# Patient Record
Sex: MALE | Race: WHITE | NOT HISPANIC OR LATINO | Employment: FULL TIME | URBAN - METROPOLITAN AREA
[De-identification: names, ages, dates, MRNs, and addresses within clinical notes are randomized per-mention and may not be internally consistent; named-entity substitution may affect disease eponyms.]

---

## 2017-03-17 ENCOUNTER — OFFICE VISIT (OUTPATIENT)
Dept: PSYCHIATRY | Facility: CLINIC | Age: 42
End: 2017-03-17
Payer: COMMERCIAL

## 2017-03-17 VITALS
HEIGHT: 71 IN | SYSTOLIC BLOOD PRESSURE: 142 MMHG | BODY MASS INDEX: 37.94 KG/M2 | WEIGHT: 271 LBS | HEART RATE: 66 BPM | DIASTOLIC BLOOD PRESSURE: 79 MMHG

## 2017-03-17 DIAGNOSIS — G47.00 INSOMNIA, UNSPECIFIED TYPE: ICD-10-CM

## 2017-03-17 DIAGNOSIS — F32.1 MODERATE SINGLE CURRENT EPISODE OF MAJOR DEPRESSIVE DISORDER: ICD-10-CM

## 2017-03-17 DIAGNOSIS — F43.10 PTSD (POST-TRAUMATIC STRESS DISORDER): Primary | ICD-10-CM

## 2017-03-17 PROCEDURE — 99204 OFFICE O/P NEW MOD 45 MIN: CPT | Mod: S$GLB,,, | Performed by: PSYCHIATRY & NEUROLOGY

## 2017-03-17 PROCEDURE — 99999 PR PBB SHADOW E&M-NEW PATIENT-LVL III: CPT | Mod: PBBFAC,,, | Performed by: PSYCHIATRY & NEUROLOGY

## 2017-03-17 PROCEDURE — 1160F RVW MEDS BY RX/DR IN RCRD: CPT | Mod: S$GLB,,, | Performed by: PSYCHIATRY & NEUROLOGY

## 2017-03-17 RX ORDER — SERTRALINE HYDROCHLORIDE 50 MG/1
50 TABLET, FILM COATED ORAL DAILY
Qty: 30 TABLET | Refills: 1 | Status: SHIPPED | OUTPATIENT
Start: 2017-03-17 | End: 2019-01-14

## 2017-03-17 RX ORDER — PRAZOSIN HYDROCHLORIDE 1 MG/1
1 CAPSULE ORAL NIGHTLY
Qty: 30 CAPSULE | Refills: 1 | Status: SHIPPED | OUTPATIENT
Start: 2017-03-17 | End: 2019-01-14
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
## 2019-01-14 ENCOUNTER — OFFICE VISIT (OUTPATIENT)
Dept: UROLOGY | Facility: CLINIC | Age: 44
End: 2019-01-14
Payer: COMMERCIAL

## 2019-01-14 VITALS
SYSTOLIC BLOOD PRESSURE: 136 MMHG | BODY MASS INDEX: 38.27 KG/M2 | WEIGHT: 273.38 LBS | HEIGHT: 71 IN | HEART RATE: 81 BPM | DIASTOLIC BLOOD PRESSURE: 89 MMHG

## 2019-01-14 DIAGNOSIS — E29.1 TESTICULAR FAILURE: Primary | ICD-10-CM

## 2019-01-14 PROCEDURE — 99999 PR PBB SHADOW E&M-EST. PATIENT-LVL III: ICD-10-PCS | Mod: PBBFAC,,, | Performed by: UROLOGY

## 2019-01-14 PROCEDURE — 99999 PR PBB SHADOW E&M-EST. PATIENT-LVL III: CPT | Mod: PBBFAC,,, | Performed by: UROLOGY

## 2019-01-14 PROCEDURE — 99204 PR OFFICE/OUTPT VISIT, NEW, LEVL IV, 45-59 MIN: ICD-10-PCS | Mod: S$GLB,,, | Performed by: UROLOGY

## 2019-01-14 PROCEDURE — 3008F BODY MASS INDEX DOCD: CPT | Mod: CPTII,S$GLB,, | Performed by: UROLOGY

## 2019-01-14 PROCEDURE — 99204 OFFICE O/P NEW MOD 45 MIN: CPT | Mod: S$GLB,,, | Performed by: UROLOGY

## 2019-01-14 PROCEDURE — 3008F PR BODY MASS INDEX (BMI) DOCUMENTED: ICD-10-PCS | Mod: CPTII,S$GLB,, | Performed by: UROLOGY

## 2019-01-14 NOTE — PROGRESS NOTES
"Chief Complaint:  Infertility    HPI:    Mr. Coffman is a 43 y.o.  male who has been  to his wife for the past 8 months. They have been trying to achieve a pregnancy for the past 8 months but without success. 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 He denies a history of erectile dysfunction and ejaculatory problems.    He is s/p a vasectomy and a vas reversal in 2016.  He was also on TRT.  He  stopped TRT 1 month ago.    He has achieved 3 pregnancies in the past with a different partner.    Kathy Coffman is 28 years old. ( 1/[birth year]/91) Her menses are regular. She has not undergone prior hysterosalpingogram. She has achieved 1 prior pregnancies with a different partner.  She sees Dr. Atwood, Sr.    The couple has not undergone prior intrauterine insemination procedures.    The couple has not undergone prior in-vitro fertilization procedures.    Eliel Coffman denies a history of exposure to harmful chemicals, toxins, and radiation.    No history of recent fevers greater than 101.5 degrees Farenheit.    No history of recent exposure to "wet heat."    No history of urological trauma or testicular torsion.    No history of prostatitis, epididymitis, and orchitis.    No history of post-pubertal mumps.    There is no known family history of fertility problems.    REVIEW OF SYSTEMS:     He denies headache, blurred vision, fever, nausea, vomiting, chills, flank discomfort, abdominal pain, bleeding per rectum, cough, SOB, recent loss of consciousness, recent mental status changes, seizures, dizziness, or upper/lower extremity weakness.    PHYSICAL EXAM:     The patient generally appears in good health, is appropriately interactive, and is in no apparent distress.     Eyes: anicteric sclerae, moist conjunctivae; no lid-lag; PERRLA     HENT: Atraumatic; oropharynx clear with moist mucous membranes and no mucosal ulcerations;normal hard and soft " "palate.  No evidence of lymphadenopathy.    Neck: Trachea midline.  No thyromegaly.    Musculoskeletal: No abnormal gait.    Skin: No lesions.    Mental: Cooperative with normal affect.  Is oriented to time, place, and person.    Neuro: Grossly intact.    Chest: Normal inspiratory effort.   No accessory muscles.  No audible wheezes.  Respirations symmetric on inspiration and expiration.    Heart: Regular rhythm.      Abdomen:  Soft, non-tender. No masses or organomegaly. Bladder is not palpable. No evidence of flank discomfort. No evidence of inguinal hernia.    Genitourinary: Penis is normal with no evidence of plaques or induration. Urethral meatus is normal. Scrotum is normal. Testes are descended bilaterally with no evidence of abnormal masses or tenderness. Epididymis, vas deferens, and cord structures are normal bilaterally.  Testicular volume is approximately 20 cc bilaterally.    Extremities: No cyanosis, clubbing, or edema.    IMPRESSION & PLAN:    Mr. Coffman is a 43 y.o.  male who has been  to his wife for the past 8 months. They have been trying to achieve a pregnancy for the past 8 months but without success. 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 He denies a history of erectile dysfunction and ejaculatory problems.    He is s/p a vasectomy and a vas reversal in 2016.  He was also on TRT.  He  stopped TRT 1 month ago.    He has achieved 3 pregnancies in the past with a different partner.    1.  FSH, LH, testosterone, prolactin, and estradiol serum levels today.  2.  Semen analysis x 1 in 2 months.  3.  Discussed TRT's effect on spermatogenesis.  Continue to stay off this.  4.  Recommend avoiding "wet heat."  5.  Recommend taking a multivitamin and 500 mg of vitamin c daily in addition to the multivitamin.  6.  Please send a copy of the note to Dr. Atwood.  Thank you for the consultation.  7.  If T is low, will call in " clomid.  Will base that off his T.    CC: Angelic

## 2019-01-14 NOTE — LETTER
January 14, 2019      Umberto Atwood MD  [address]  [place] [address]           [facility] - Urology 4th Floor  [address]  [place] [address]  Phone: [phone]          Patient: Eliel Coffman   MR Number: [id number]   YOB: 1975   Date of Visit: 1/14/2019       Dear Dr. Umberto Atwood:    Thank you for referring Eliel Coffman to me for evaluation. Attached you will find relevant portions of my assessment and plan of care.    If you have questions, please do not hesitate to call me. I look forward to following Eliel Coffman along with you.    Sincerely,    Oziel Shaver MD    Enclosure  CC:  No Recipients    If you would like to receive this communication electronically, please contact [email or web address] or [phone] to request more information on [employer] access.    For providers and/or their staff who would like to refer a patient to [facility], please contact us through our one-stop-shop provider referral line, [facility], at [phone].    If you feel you have received this communication in error or would no longer like to receive these types of communications, please e-mail [email or web address]

## 2019-01-14 NOTE — LETTER
January 14, 2019        Umberto Atwood MD  [address]  [place] [address]             [facility] - Urology 4th Floor  [address]  [place] [address]  Phone: [phone]   Patient: Eliel Coffman   MR Number: [id number]   YOB: 1975   Date of Visit: 1/14/2019       Dear Dr. Atwood:    Thank you for referring Eliel Coffman to me for evaluation. Attached you will find relevant portions of my assessment and plan of care.    If you have questions, please do not hesitate to call me. I look forward to following Eliel Coffman along with you.    Sincerely,      Oziel Shaver MD            CC  No Recipients    Enclosure

## 2019-01-15 ENCOUNTER — TELEPHONE (OUTPATIENT)
Dept: UROLOGY | Facility: CLINIC | Age: 44
End: 2019-01-15

## 2019-01-15 DIAGNOSIS — E29.1 TESTICULAR FAILURE: Primary | ICD-10-CM

## 2019-01-15 NOTE — TELEPHONE ENCOUNTER
Pt notified of results. appt scheduled for am to have repeat level. Pt aware and verbalized understanding.

## 2019-01-16 ENCOUNTER — LAB VISIT (OUTPATIENT)
Dept: LAB | Facility: HOSPITAL | Age: 44
End: 2019-01-16
Attending: UROLOGY
Payer: COMMERCIAL

## 2019-01-16 DIAGNOSIS — E29.1 TESTICULAR FAILURE: ICD-10-CM

## 2019-01-16 LAB — TESTOST SERPL-MCNC: 228 NG/DL

## 2019-01-16 PROCEDURE — 36415 COLL VENOUS BLD VENIPUNCTURE: CPT | Mod: PO

## 2019-01-16 PROCEDURE — 84403 ASSAY OF TOTAL TESTOSTERONE: CPT

## 2019-01-17 ENCOUNTER — TELEPHONE (OUTPATIENT)
Dept: UROLOGY | Facility: CLINIC | Age: 44
End: 2019-01-17

## 2019-01-17 DIAGNOSIS — E29.1 TESTICULAR FAILURE: Primary | ICD-10-CM

## 2019-02-25 ENCOUNTER — LAB VISIT (OUTPATIENT)
Dept: LAB | Facility: HOSPITAL | Age: 44
End: 2019-02-25
Attending: UROLOGY
Payer: COMMERCIAL

## 2019-02-25 DIAGNOSIS — E29.1 TESTICULAR FAILURE: ICD-10-CM

## 2019-02-25 LAB — TESTOST SERPL-MCNC: 442 NG/DL

## 2019-02-25 PROCEDURE — 36415 COLL VENOUS BLD VENIPUNCTURE: CPT | Mod: PO

## 2019-02-25 PROCEDURE — 84403 ASSAY OF TOTAL TESTOSTERONE: CPT

## 2019-02-26 ENCOUNTER — TELEPHONE (OUTPATIENT)
Dept: UROLOGY | Facility: CLINIC | Age: 44
End: 2019-02-26

## 2019-02-26 NOTE — TELEPHONE ENCOUNTER
Pt notified of results, to continue taking current dose of clomid. Return to clinic in 3 months with two semen samples done prior to appt. Pt verbalized understanding.

## 2019-03-21 ENCOUNTER — TELEPHONE (OUTPATIENT)
Dept: UROLOGY | Facility: CLINIC | Age: 44
End: 2019-03-21

## 2019-03-21 NOTE — TELEPHONE ENCOUNTER
----- Message from Lulu Philip RN sent at 3/20/2019  5:14 PM CDT -----  Contact: self [phone]      ----- Message -----  From: Tereza Mulligan  Sent: 3/20/2019   1:09 PM  To: Sivakumar Shaver    Needs Advice    Reason for call: is calling to get a semen analysis done        Communication Preference: [phone]    Additional Information:

## 2019-03-21 NOTE — TELEPHONE ENCOUNTER
Spoke to pt. Reviewed semen analysis instructions with pt. Address and phone number given to [facility]. Pt aware and verbalized understanding.

## 2019-04-23 ENCOUNTER — PATIENT MESSAGE (OUTPATIENT)
Dept: UROLOGY | Facility: CLINIC | Age: 44
End: 2019-04-23

## 2019-04-23 ENCOUNTER — TELEPHONE (OUTPATIENT)
Dept: UROLOGY | Facility: CLINIC | Age: 44
End: 2019-04-23

## 2019-04-23 NOTE — TELEPHONE ENCOUNTER
----- Message from Mela May MA sent at 4/23/2019  1:11 PM CDT -----  Contact: [phone]  Needs Advice    Reason for call: pt is looking for the results of his SA that was done at [facility] 4/12/19         Communication Preference: [phone]    Additional Information: please call

## 2019-06-06 ENCOUNTER — OFFICE VISIT (OUTPATIENT)
Dept: UROLOGY | Facility: CLINIC | Age: 44
End: 2019-06-06
Payer: COMMERCIAL

## 2019-06-06 VITALS
DIASTOLIC BLOOD PRESSURE: 77 MMHG | WEIGHT: 273.38 LBS | SYSTOLIC BLOOD PRESSURE: 121 MMHG | HEIGHT: 71 IN | HEART RATE: 58 BPM | BODY MASS INDEX: 38.27 KG/M2

## 2019-06-06 DIAGNOSIS — E29.1 TESTICULAR FAILURE: Primary | ICD-10-CM

## 2019-06-06 PROCEDURE — 99999 PR PBB SHADOW E&M-EST. PATIENT-LVL III: ICD-10-PCS | Mod: PBBFAC,,, | Performed by: UROLOGY

## 2019-06-06 PROCEDURE — 99999 PR PBB SHADOW E&M-EST. PATIENT-LVL III: CPT | Mod: PBBFAC,,, | Performed by: UROLOGY

## 2019-06-06 PROCEDURE — 3008F PR BODY MASS INDEX (BMI) DOCUMENTED: ICD-10-PCS | Mod: CPTII,S$GLB,, | Performed by: UROLOGY

## 2019-06-06 PROCEDURE — 3008F BODY MASS INDEX DOCD: CPT | Mod: CPTII,S$GLB,, | Performed by: UROLOGY

## 2019-06-06 PROCEDURE — 99214 PR OFFICE/OUTPT VISIT, EST, LEVL IV, 30-39 MIN: ICD-10-PCS | Mod: S$GLB,,, | Performed by: UROLOGY

## 2019-06-06 PROCEDURE — 99214 OFFICE O/P EST MOD 30 MIN: CPT | Mod: S$GLB,,, | Performed by: UROLOGY

## 2019-06-06 RX ORDER — INDOMETHACIN 50 MG/1
CAPSULE ORAL
Refills: 0 | COMMUNITY
Start: 2019-05-31 | End: 2023-03-27

## 2019-06-06 RX ORDER — CLOMIPHENE CITRATE 50 MG/1
TABLET ORAL
Refills: 2 | COMMUNITY
Start: 2019-05-31 | End: 2019-06-06

## 2019-06-06 RX ORDER — CLOMIPHENE CITRATE 50 MG/1
TABLET ORAL
Qty: 15 TABLET | Refills: 5 | Status: SHIPPED | OUTPATIENT
Start: 2019-06-06 | End: 2020-01-06 | Stop reason: SDUPTHER

## 2019-06-06 NOTE — LETTER
June 6, 2019        Umberto Atwood MD  [address]  [place] [address]             [facility] - [facility] [place]  [address]  [place] [address]  Phone: [phone]   Patient: Eliel Coffman   MR Number: [id number]   YOB: 1975   Date of Visit: 6/6/2019       Dear Dr. Atwood:    Thank you for referring Eliel Coffman to me for evaluation. Attached you will find relevant portions of my assessment and plan of care.    If you have questions, please do not hesitate to call me. I look forward to following Eliel Coffman along with you.    Sincerely,      Oziel Shaver MD            CC  No Recipients    Enclosure

## 2019-06-06 NOTE — PROGRESS NOTES
"Chief Complaint:  Infertility    HPI:    Mr. Coffman is a 43 y.o.  male who has been  to his wife for the past 1 year. They have been trying to achieve a pregnancy for the past 1 year but without success. 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 He denies a history of erectile dysfunction and ejaculatory problems.    He is s/p a vasectomy and a vas reversal in .  He was also on TRT.  He  stopped TRT 4 months ago.    Lab Results   Component Value Date    TOTALTESTOST 442 2019    LABLH 1.2 2019    FSH 1.20 2019    ESTRADIOL 26 2019    PROLACTIN 6.0 2019      SA 19--1.5 cc/7.3 million per cc/24%/5.5%  SA 19--1.0 cc/102 million per cc/47%/5.5%    FOR REVIEW FROM PREVIOUS:    Mr. Coffman is a 43 y.o.  male who has been  to his wife for the past 8 months. They have been trying to achieve a pregnancy for the past 8 months but without success. Eliel Coffman has undergone a semen analysis x 1 at [facility] showing azoospermia, but I don't have the results. He denies a history of erectile dysfunction and ejaculatory problems.    He is s/p a vasectomy and a vas reversal in .  He was also on TRT.  He  stopped TRT 1 month ago.    He has achieved 3 pregnancies in the past with a different partner.    Kathy Coffman is 28 years old. ( 1/10/91) Her menses are regular. She has not undergone prior hysterosalpingogram. She has achieved 1 prior pregnancies with a different partner.  She sees Dr. Atwood, Sr.    The couple has not undergone prior intrauterine insemination procedures.    The couple has not undergone prior in-vitro fertilization procedures.    Eliel Coffman denies a history of exposure to harmful chemicals, toxins, and radiation.    No history of recent fevers greater than 101.5 degrees Farenheit.    No history of recent exposure to "wet heat."    No history of " urological trauma or testicular torsion.    No history of prostatitis, epididymitis, and orchitis.    No history of post-pubertal mumps.    There is no known family history of fertility problems.    REVIEW OF SYSTEMS:     He denies headache, blurred vision, fever, nausea, vomiting, chills, flank discomfort, abdominal pain, bleeding per rectum, cough, SOB, recent loss of consciousness, recent mental status changes, seizures, dizziness, or upper/lower extremity weakness.    PHYSICAL EXAM:     The patient generally appears in good health, is appropriately interactive, and is in no apparent distress.     Eyes: anicteric sclerae, moist conjunctivae; no lid-lag; PERRLA     HENT: Atraumatic; oropharynx clear with moist mucous membranes and no mucosal ulcerations;normal hard and soft palate.  No evidence of lymphadenopathy.    Neck: Trachea midline.  No thyromegaly.    Musculoskeletal: No abnormal gait.    Skin: No lesions.    Mental: Cooperative with normal affect.  Is oriented to time, place, and person.    Neuro: Grossly intact.    Chest: Normal inspiratory effort.   No accessory muscles.  No audible wheezes.  Respirations symmetric on inspiration and expiration.    Heart: Regular rhythm.      Abdomen:  Soft, non-tender. No masses or organomegaly. Bladder is not palpable. No evidence of flank discomfort. No evidence of inguinal hernia.    Genitourinary: Penis is normal with no evidence of plaques or induration. Urethral meatus is normal. Scrotum is normal. Testes are descended bilaterally with no evidence of abnormal masses or tenderness. Epididymis, vas deferens, and cord structures are normal bilaterally.  Testicular volume is approximately 20 cc bilaterally.    Extremities: No cyanosis, clubbing, or edema.    IMPRESSION & PLAN:    Mr. Coffman is a 43 y.o.  male who has been  to his wife for the past 1 year. They have been trying to achieve a pregnancy for the past 1 year but without success. 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 He denies a history of erectile dysfunction and ejaculatory problems.    He is s/p a vasectomy and a vas reversal in 2016.  He was also on TRT.  He  stopped TRT 4 months ago.  He's currently on clomid    Lab Results   Component Value Date    TOTALTESTOST 442 02/25/2019    LABLH 1.2 01/14/2019    FSH 1.20 01/14/2019    ESTRADIOL 26 01/14/2019    PROLACTIN 6.0 01/14/2019      SA 4/12/19--1.5 cc/7.3 million per cc/24%/5.5%  SA 5/20/19--1.0 cc/102 million per cc/47%/5.5%    1.  Went over the results of his SA and hormonal panel today  2.  Discussed TRT's effect on spermatogenesis.  Continue to stay off this.  3.  Discussed that his SA's are discordant, but this could be due to his TRT use and/or clomid as the most recent one is markedly improved.  4.  Recommend avoiding "wet heat."  5.  Recommend taking a multivitamin and 500 mg of vitamin c daily in addition to the multivitamin.  6.  Please send a copy of the note to Dr. Atwood.    7. Continue clomid. Refilled today  8.  From a male factor perspective efforts with natural means, IUI, and IVF are all appropriate.  9.  RTC 6 months if not pregnant by then.  10. I spent 25 minutes with the patient of which more than half was spent in direct consultation with the patient in regards to our treatment and plan.       CC: Angelic    "

## 2020-01-06 ENCOUNTER — OFFICE VISIT (OUTPATIENT)
Dept: UROLOGY | Facility: CLINIC | Age: 45
End: 2020-01-06
Payer: COMMERCIAL

## 2020-01-06 VITALS
HEIGHT: 71 IN | SYSTOLIC BLOOD PRESSURE: 122 MMHG | DIASTOLIC BLOOD PRESSURE: 85 MMHG | HEART RATE: 83 BPM | BODY MASS INDEX: 37.35 KG/M2 | WEIGHT: 266.75 LBS

## 2020-01-06 DIAGNOSIS — E29.1 TESTICULAR FAILURE: Primary | ICD-10-CM

## 2020-01-06 PROCEDURE — 99213 OFFICE O/P EST LOW 20 MIN: CPT | Mod: S$GLB,,, | Performed by: UROLOGY

## 2020-01-06 PROCEDURE — 3008F PR BODY MASS INDEX (BMI) DOCUMENTED: ICD-10-PCS | Mod: CPTII,S$GLB,, | Performed by: UROLOGY

## 2020-01-06 PROCEDURE — 3008F BODY MASS INDEX DOCD: CPT | Mod: CPTII,S$GLB,, | Performed by: UROLOGY

## 2020-01-06 PROCEDURE — 99999 PR PBB SHADOW E&M-EST. PATIENT-LVL III: CPT | Mod: PBBFAC,,, | Performed by: UROLOGY

## 2020-01-06 PROCEDURE — 99213 PR OFFICE/OUTPT VISIT, EST, LEVL III, 20-29 MIN: ICD-10-PCS | Mod: S$GLB,,, | Performed by: UROLOGY

## 2020-01-06 PROCEDURE — 99999 PR PBB SHADOW E&M-EST. PATIENT-LVL III: ICD-10-PCS | Mod: PBBFAC,,, | Performed by: UROLOGY

## 2020-01-06 RX ORDER — CLOMIPHENE CITRATE 50 MG/1
TABLET ORAL
Qty: 15 TABLET | Refills: 5 | Status: SHIPPED | OUTPATIENT
Start: 2020-01-06 | End: 2020-02-24 | Stop reason: SDUPTHER

## 2020-01-06 NOTE — PROGRESS NOTES
Chief Complaint:  Infertility    HPI:    Mr. Coffman is a 44 y.o.  male who has been  to his wife for the past 1 year. They have been trying to achieve a pregnancy for the past 1 year but without success. 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 He denies a history of erectile dysfunction and ejaculatory problems.    He is s/p a vasectomy and a vas reversal in .  He was also on TRT.  He  stopped TRT 10 months ago.  Has been on clomid.    Lab Results   Component Value Date    TOTALTESTOST 442 2019    LABLH 1.2 2019    FSH 1.20 2019    ESTRADIOL 26 2019    PROLACTIN 6.0 2019      SA 19--1.5 cc/7.3 million per cc/24%/5.5%  SA 19--1.0 cc/102 million per cc/47%/5.5%    FOR REVIEW FROM PREVIOUS:    Mr. Coffman is a 44 y.o.  male who has been  to his wife for the past 1 year. They have been trying to achieve a pregnancy for the past 1 year but without success. 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 He denies a history of erectile dysfunction and ejaculatory problems.    He is s/p a vasectomy and a vas reversal in 2016.  He was also on TRT.  He  stopped TRT 4 months ago.    Lab Results   Component Value Date    TOTALTESTOST 442 2019    LABLH 1.2 2019    FSH 1.20 2019    ESTRADIOL 26 2019    PROLACTIN 6.0 2019      SA 19--1.5 cc/7.3 million per cc/24%/5.5%  SA 19--1.0 cc/102 million per cc/47%/5.5%    He has achieved 3 pregnancies in the past with a different partner.    Kathy Coffman is 28 years old. ( [birth year]) Her menses are regular. She has not undergone prior hysterosalpingogram. She has achieved 1 prior pregnancies with a different partner.  She sees Dr. Angelic Sr.    The couple has not undergone prior intrauterine insemination  "procedures.    The couple has not undergone prior in-vitro fertilization procedures.    Eliel Coffman denies a history of exposure to harmful chemicals, toxins, and radiation.    No history of recent fevers greater than 101.5 degrees Farenheit.    No history of recent exposure to "wet heat."    No history of urological trauma or testicular torsion.    No history of prostatitis, epididymitis, and orchitis.    No history of post-pubertal mumps.    There is no known family history of fertility problems.    REVIEW OF SYSTEMS:     He denies headache, blurred vision, fever, nausea, vomiting, chills, flank discomfort, abdominal pain, bleeding per rectum, cough, SOB, recent loss of consciousness, recent mental status changes, seizures, dizziness, or upper/lower extremity weakness.    PHYSICAL EXAM:     The patient generally appears in good health, is appropriately interactive, and is in no apparent distress.     Eyes: anicteric sclerae, moist conjunctivae; no lid-lag; PERRLA     HENT: Atraumatic; oropharynx clear with moist mucous membranes and no mucosal ulcerations;normal hard and soft palate.  No evidence of lymphadenopathy.    Neck: Trachea midline.  No thyromegaly.    Musculoskeletal: No abnormal gait.    Skin: No lesions.    Mental: Cooperative with normal affect.  Is oriented to time, place, and person.    Neuro: Grossly intact.    Chest: Normal inspiratory effort.   No accessory muscles.  No audible wheezes.  Respirations symmetric on inspiration and expiration.    Heart: Regular rhythm.      Abdomen:  Soft, non-tender. No masses or organomegaly. Bladder is not palpable. No evidence of flank discomfort. No evidence of inguinal hernia.    Genitourinary: Penis is normal with no evidence of plaques or induration. Urethral meatus is normal. Scrotum is normal. Testes are descended bilaterally with no evidence of abnormal masses or tenderness. Epididymis, vas deferens, and cord structures are normal bilaterally.  " "Testicular volume is approximately 20 cc bilaterally.    Extremities: No cyanosis, clubbing, or edema.    IMPRESSION & PLAN:    Mr. Coffman is a 43 y.o.  male who has been  to his wife for the past 1 year. They have been trying to achieve a pregnancy for the past 1 year but without success. 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 He denies a history of erectile dysfunction and ejaculatory problems.    He is s/p a vasectomy and a vas reversal in 2016.  He was also on TRT.  He  stopped TRT 4 months ago.  He's currently on clomid    Lab Results   Component Value Date    TOTALTESTOST 442 02/25/2019    LABLH 1.2 01/14/2019    FSH 1.20 01/14/2019    ESTRADIOL 26 01/14/2019    PROLACTIN 6.0 01/14/2019      SA 4/12/19--1.5 cc/7.3 million per cc/24%/5.5%  SA 5/20/19--1.0 cc/102 million per cc/47%/5.5%    1.  Will draw T, LH, FSH, estradiol, and prolactin.  2.  Discussed TRT's effect on spermatogenesis.  Continue to stay off this.  3.  RTC 3 weeks with another SA.  4.  Recommend avoiding "wet heat."  5.  Recommend taking a multivitamin and 500 mg of vitamin c daily in addition to the multivitamin.  6.  Please send a copy of the note to Dr. Atwood.    7. Continue clomid. Refilled today  8.  From a male factor perspective efforts with natural means, IUI, and IVF are all appropriate.        CC: Angelic    "

## 2020-01-06 NOTE — LETTER
January 6, 2020        Umberto Atwood MD  [address]  [place] [address]             [facility] - Urology 4th Floor  [address]  [place] [address]  Phone: [phone]   Patient: Eliel Coffman   MR Number: [id number]   YOB: 1975   Date of Visit: 1/6/2020       Dear Dr. Atwood:    Thank you for referring Eliel Coffman to me for evaluation. Attached you will find relevant portions of my assessment and plan of care.    If you have questions, please do not hesitate to call me. I look forward to following Eliel Coffman along with you.    Sincerely,      Oziel Shaver MD            CC  No Recipients    Enclosure

## 2020-02-24 ENCOUNTER — OFFICE VISIT (OUTPATIENT)
Dept: UROLOGY | Facility: CLINIC | Age: 45
End: 2020-02-24
Payer: COMMERCIAL

## 2020-02-24 VITALS
DIASTOLIC BLOOD PRESSURE: 83 MMHG | HEIGHT: 71 IN | BODY MASS INDEX: 37.04 KG/M2 | HEART RATE: 76 BPM | WEIGHT: 264.56 LBS | SYSTOLIC BLOOD PRESSURE: 119 MMHG

## 2020-02-24 DIAGNOSIS — E29.1 TESTICULAR FAILURE: Primary | ICD-10-CM

## 2020-02-24 PROCEDURE — 99214 PR OFFICE/OUTPT VISIT, EST, LEVL IV, 30-39 MIN: ICD-10-PCS | Mod: S$GLB,,, | Performed by: UROLOGY

## 2020-02-24 PROCEDURE — 3008F BODY MASS INDEX DOCD: CPT | Mod: CPTII,S$GLB,, | Performed by: UROLOGY

## 2020-02-24 PROCEDURE — 99999 PR PBB SHADOW E&M-EST. PATIENT-LVL III: CPT | Mod: PBBFAC,,, | Performed by: UROLOGY

## 2020-02-24 PROCEDURE — 3008F PR BODY MASS INDEX (BMI) DOCUMENTED: ICD-10-PCS | Mod: CPTII,S$GLB,, | Performed by: UROLOGY

## 2020-02-24 PROCEDURE — 99999 PR PBB SHADOW E&M-EST. PATIENT-LVL III: ICD-10-PCS | Mod: PBBFAC,,, | Performed by: UROLOGY

## 2020-02-24 PROCEDURE — 99214 OFFICE O/P EST MOD 30 MIN: CPT | Mod: S$GLB,,, | Performed by: UROLOGY

## 2020-02-24 RX ORDER — CLOMIPHENE CITRATE 50 MG/1
TABLET ORAL
Qty: 15 TABLET | Refills: 5 | Status: SHIPPED | OUTPATIENT
Start: 2020-02-24 | End: 2023-03-27

## 2020-02-24 NOTE — PROGRESS NOTES
Chief Complaint:  Infertility    HPI:    Mr. Coffman is a 44 y.o.  male who has been  to his wife for the past 2 years. They have been trying to achieve a pregnancy for the past 2 years but without success. 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 He denies a history of erectile dysfunction and ejaculatory problems.    He is s/p a vasectomy and a vas reversal in .  He was also on TRT.  He  stopped TRT > 11 months ago.  Has been on clomid.  His most recent SA on clomid shows an isolated morphology defect.    Lab Results   Component Value Date    TOTALTESTOST 433 2020    LABLH 3.1 2020    FSH 1.50 2020    ESTRADIOL 24 2020    PROLACTIN 7.1 2020      SA 19--1.5 cc/7.3 million per cc/24%/5.5%  SA 19--1.0 cc/102 million per cc/47%/5.5%  SA 20--1.0 cc/145 million per cc/62%/7%    FOR REVIEW FROM PREVIOUS:    Mr. Coffman is a 44 y.o.  male who has been  to his wife for the past 1 year. They have been trying to achieve a pregnancy for the past 1 year but without success. 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 He denies a history of erectile dysfunction and ejaculatory problems.    He is s/p a vasectomy and a vas reversal in .  He was also on TRT.  He  stopped TRT 4 months ago.    Lab Results   Component Value Date    TOTALTESTOST 442 2019    LABLH 1.2 2019    FSH 1.20 2019    ESTRADIOL 26 2019    PROLACTIN 6.0 2019      SA 19--1.5 cc/7.3 million per cc/24%/5.5%  SA 19--1.0 cc/102 million per cc/47%/5.5%    He has achieved 3 pregnancies in the past with a different partner.    Kathy Coffman is 28 years old. ( 1/[birth year]) Her menses are regular. She has not undergone prior hysterosalpingogram. She has achieved 1 prior pregnancies with a  "different partner.  She sees Dr. Atwood, Sr.    The couple has not undergone prior intrauterine insemination procedures.    The couple has not undergone prior in-vitro fertilization procedures.    Eliel Coffman denies a history of exposure to harmful chemicals, toxins, and radiation.    No history of recent fevers greater than 101.5 degrees Farenheit.    No history of recent exposure to "wet heat."    No history of urological trauma or testicular torsion.    No history of prostatitis, epididymitis, and orchitis.    No history of post-pubertal mumps.    There is no known family history of fertility problems.    REVIEW OF SYSTEMS:     He denies headache, blurred vision, fever, nausea, vomiting, chills, flank discomfort, abdominal pain, bleeding per rectum, cough, SOB, recent loss of consciousness, recent mental status changes, seizures, dizziness, or upper/lower extremity weakness.    PHYSICAL EXAM:     The patient generally appears in good health, is appropriately interactive, and is in no apparent distress.     Eyes: anicteric sclerae, moist conjunctivae; no lid-lag; PERRLA     HENT: Atraumatic; oropharynx clear with moist mucous membranes and no mucosal ulcerations;normal hard and soft palate.  No evidence of lymphadenopathy.    Neck: Trachea midline.  No thyromegaly.    Musculoskeletal: No abnormal gait.    Skin: No lesions.    Mental: Cooperative with normal affect.  Is oriented to time, place, and person.    Neuro: Grossly intact.    Chest: Normal inspiratory effort.   No accessory muscles.  No audible wheezes.  Respirations symmetric on inspiration and expiration.    Heart: Regular rhythm.      Abdomen:  Soft, non-tender. No masses or organomegaly. Bladder is not palpable. No evidence of flank discomfort. No evidence of inguinal hernia.    Genitourinary: Penis is normal with no evidence of plaques or induration. Urethral meatus is normal. Scrotum is normal. Testes are descended bilaterally with no evidence " "of abnormal masses or tenderness. Epididymis, vas deferens, and cord structures are normal bilaterally.  Testicular volume is approximately 20 cc bilaterally.    Extremities: No cyanosis, clubbing, or edema.    IMPRESSION & PLAN:    Mr. Coffman is a 44 y.o.  male who has been  to his wife for the past 2 years. They have been trying to achieve a pregnancy for the past 2 years but without success. 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 He denies a history of erectile dysfunction and ejaculatory problems.    He is s/p a vasectomy and a vas reversal in 2016.  He was also on TRT.  He  stopped TRT > 11 months ago.  Has been on clomid.  His most recent SA on clomid shows an isolated morphology defect.    Lab Results   Component Value Date    TOTALTESTOST 433 01/06/2020    LABLH 3.1 01/06/2020    FSH 1.50 01/06/2020    ESTRADIOL 24 01/06/2020    PROLACTIN 7.1 01/06/2020      SA 4/12/19--1.5 cc/7.3 million per cc/24%/5.5%  SA 5/20/19--1.0 cc/102 million per cc/47%/5.5%  SA 2/13/20--1.0 cc/145 million per cc/62%/7%    1.  Went over the results of his SA and hormonal panel.    2.  Discussed TRT's effect on spermatogenesis.  Continue to stay off this.  3.  RTC 3 weeks with another SA.  4.  Recommend avoiding "wet heat."  5.  Recommend taking a multivitamin and 500 mg of vitamin c daily in addition to the multivitamin.  6.  Please send a copy of the note to Dr. Atwood.    7. Continue clomid. Refilled today  8.  From a male factor perspective efforts with natural means, IUI, and IVF are all appropriate.  9.  I spent 25 minutes with the patient of which more than half was spent in direct consultation with the patient in regards to our treatment and plan.    10.  RTC 6 months if not pregnant by then.        CC: Angelic    "

## 2020-02-24 NOTE — LETTER
February 24, 2020        Umberto Atwood MD  [address]  [place] [address]             [facility] - Urology 4th Floor  [address]  [place] [address]  Phone: [phone]   Patient: Eliel Coffman   MR Number: [id number]   YOB: 1975   Date of Visit: 2/24/2020       Dear Dr. Atwood:    Thank you for referring Eliel Coffman to me for evaluation. Attached you will find relevant portions of my assessment and plan of care.    If you have questions, please do not hesitate to call me. I look forward to following Eliel Coffman along with you.    Sincerely,      Oziel Shaver MD            CC  No Recipients    Enclosure

## 2020-08-10 ENCOUNTER — OFFICE VISIT (OUTPATIENT)
Dept: UROLOGY | Facility: CLINIC | Age: 45
End: 2020-08-10
Payer: COMMERCIAL

## 2020-08-10 VITALS
BODY MASS INDEX: 37.94 KG/M2 | HEIGHT: 71 IN | SYSTOLIC BLOOD PRESSURE: 131 MMHG | WEIGHT: 271 LBS | HEART RATE: 66 BPM | DIASTOLIC BLOOD PRESSURE: 89 MMHG

## 2020-08-10 DIAGNOSIS — E29.1 MALE HYPOGONADISM: Primary | ICD-10-CM

## 2020-08-10 PROCEDURE — 3008F PR BODY MASS INDEX (BMI) DOCUMENTED: ICD-10-PCS | Mod: CPTII,S$GLB,, | Performed by: UROLOGY

## 2020-08-10 PROCEDURE — 3008F BODY MASS INDEX DOCD: CPT | Mod: CPTII,S$GLB,, | Performed by: UROLOGY

## 2020-08-10 PROCEDURE — 99214 PR OFFICE/OUTPT VISIT, EST, LEVL IV, 30-39 MIN: ICD-10-PCS | Mod: S$GLB,,, | Performed by: UROLOGY

## 2020-08-10 PROCEDURE — 99214 OFFICE O/P EST MOD 30 MIN: CPT | Mod: S$GLB,,, | Performed by: UROLOGY

## 2020-08-10 PROCEDURE — 99999 PR PBB SHADOW E&M-EST. PATIENT-LVL III: CPT | Mod: PBBFAC,,, | Performed by: UROLOGY

## 2020-08-10 PROCEDURE — 99999 PR PBB SHADOW E&M-EST. PATIENT-LVL III: ICD-10-PCS | Mod: PBBFAC,,, | Performed by: UROLOGY

## 2020-08-10 RX ORDER — TESTOSTERONE 20.25 MG/1.25G
GEL TOPICAL
Qty: 1 BOTTLE | Refills: 5 | Status: SHIPPED | OUTPATIENT
Start: 2020-08-10 | End: 2023-03-31 | Stop reason: CLARIF

## 2020-08-10 NOTE — PROGRESS NOTES
Chief Complaint:  Infertility    HPI:    Mr. Coffman is a 45 y.o.  male who has been  to his wife for the past 2 years. They have been trying to achieve a pregnancy for the past 2 years but without success. 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 He denies a history of erectile dysfunction and ejaculatory problems.    He is s/p a vasectomy and a vas reversal in 2016.  He was also on TRT.  He  stopped TRT > 11 months ago.  Has been on clomid.  His most recent SA on clomid shows an isolated morphology defect.  However, his wife has not gotten pregnant.  He presents today stating that they are going to take a break from fertility. And he would like to go back to TRT.    Lab Results   Component Value Date    TOTALTESTOST 433 01/06/2020    LABLH 3.1 01/06/2020    FSH 1.50 01/06/2020    ESTRADIOL 24 01/06/2020    PROLACTIN 7.1 01/06/2020      SA 4/12/19--1.5 cc/7.3 million per cc/24%/5.5%  SA 5/20/19--1.0 cc/102 million per cc/47%/5.5%  SA 2/13/20--1.0 cc/145 million per cc/62%/7%    FOR REVIEW FROM PREVIOUS:    Mr. Coffman is a 45 y.o.  male who has been  to his wife for the past 2 years. They have been trying to achieve a pregnancy for the past 2 years but without success. 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 He denies a history of erectile dysfunction and ejaculatory problems.    He is s/p a vasectomy and a vas reversal in 2016.  He was also on TRT.  He  stopped TRT > 11 months ago.  Has been on clomid.  His most recent SA on clomid shows an isolated morphology defect.    Lab Results   Component Value Date    TOTALTESTOST 433 01/06/2020    LABLH 3.1 01/06/2020    FSH 1.50 01/06/2020    ESTRADIOL 24 01/06/2020    PROLACTIN 7.1 01/06/2020      SA 4/12/19--1.5 cc/7.3 million per cc/24%/5.5%  SA 5/20/19--1.0 cc/102 million per  "cc/47%/5.5%   20--1.0 cc/145 million per cc/62%/7%    He has achieved 3 pregnancies in the past with a different partner.    Kathy Coffman is 27 years old. ( [birth year]) Her menses are regular. She has not undergone prior hysterosalpingogram. She has achieved 1 prior pregnancies with a different partner.  She sees Dr. Atwood, Sr.    The couple has not undergone prior intrauterine insemination procedures.    The couple has not undergone prior in-vitro fertilization procedures.    Eliel Coffman denies a history of exposure to harmful chemicals, toxins, and radiation.    No history of recent fevers greater than 101.5 degrees Farenheit.    No history of recent exposure to "wet heat."    No history of urological trauma or testicular torsion.    No history of prostatitis, epididymitis, and orchitis.    No history of post-pubertal mumps.    There is no known family history of fertility problems.    REVIEW OF SYSTEMS:     He denies headache, blurred vision, fever, nausea, vomiting, chills, flank discomfort, abdominal pain, bleeding per rectum, cough, SOB, recent loss of consciousness, recent mental status changes, seizures, dizziness, or upper/lower extremity weakness.    PHYSICAL EXAM:     The patient generally appears in good health, is appropriately interactive, and is in no apparent distress.     Eyes: anicteric sclerae, moist conjunctivae; no lid-lag; PERRLA     HENT: Atraumatic; oropharynx clear with moist mucous membranes and no mucosal ulcerations;normal hard and soft palate.  No evidence of lymphadenopathy.    Neck: Trachea midline.  No thyromegaly.    Musculoskeletal: No abnormal gait.    Skin: No lesions.    Mental: Cooperative with normal affect.  Is oriented to time, place, and person.    Neuro: Grossly intact.    Chest: Normal inspiratory effort.   No accessory muscles.  No audible wheezes.  Respirations symmetric on inspiration and expiration.    Heart: Regular rhythm.      Abdomen:  Soft, " non-tender. No masses or organomegaly. Bladder is not palpable. No evidence of flank discomfort. No evidence of inguinal hernia.    Genitourinary: Penis is normal with no evidence of plaques or induration. Urethral meatus is normal. Scrotum is normal. Testes are descended bilaterally with no evidence of abnormal masses or tenderness. Epididymis, vas deferens, and cord structures are normal bilaterally.  Testicular volume is approximately 20 cc bilaterally.    Extremities: No cyanosis, clubbing, or edema.    IMPRESSION & PLAN:    Encounter Diagnosis   Name Primary?    Testicular failure Yes         1.  Discussed TRT's effect on spermatogenesis. He is aware and would like to go back to TRT.    2. Discussed the risks and benefits of testosterone replacement today.  This included possible cardiac risks.  He would like to try this.  Will check a T in 2 weeks and adjust the dose of TRT if necessary.  He can then RTC 6 months with T, PSA, CBC, hepatic panel, lipid panel.  A new Rx for Androgel 1.62% was given today.        CC: Angelic

## 2021-04-16 ENCOUNTER — PATIENT MESSAGE (OUTPATIENT)
Dept: RESEARCH | Facility: HOSPITAL | Age: 46
End: 2021-04-16

## 2021-10-05 ENCOUNTER — OFFICE VISIT (OUTPATIENT)
Dept: SPORTS MEDICINE | Facility: CLINIC | Age: 46
End: 2021-10-05
Payer: COMMERCIAL

## 2021-10-05 ENCOUNTER — HOSPITAL ENCOUNTER (OUTPATIENT)
Dept: RADIOLOGY | Facility: HOSPITAL | Age: 46
Discharge: HOME | End: 2021-10-05
Attending: PHYSICIAN ASSISTANT
Payer: COMMERCIAL

## 2021-10-05 VITALS
HEART RATE: 84 BPM | BODY MASS INDEX: 40.6 KG/M2 | DIASTOLIC BLOOD PRESSURE: 87 MMHG | WEIGHT: 290 LBS | SYSTOLIC BLOOD PRESSURE: 135 MMHG | HEIGHT: 71 IN

## 2021-10-05 DIAGNOSIS — M25.551 BILATERAL HIP PAIN: ICD-10-CM

## 2021-10-05 DIAGNOSIS — M25.552 BILATERAL HIP PAIN: ICD-10-CM

## 2021-10-05 DIAGNOSIS — S32.401A CLOSED NONDISPLACED FRACTURE OF RIGHT ACETABULUM, UNSPECIFIED PORTION OF ACETABULUM, INITIAL ENCOUNTER: Primary | ICD-10-CM

## 2021-10-05 PROCEDURE — 99999 PR PBB SHADOW E&M-EST. PATIENT-LVL III: ICD-10-PCS | Mod: PBBFAC,,, | Performed by: PHYSICIAN ASSISTANT

## 2021-10-05 PROCEDURE — 3008F BODY MASS INDEX DOCD: CPT | Mod: CPTII,S$GLB,, | Performed by: PHYSICIAN ASSISTANT

## 2021-10-05 PROCEDURE — 73521 XR HIPS BILATERAL 2 VIEW INCL AP PELVIS: ICD-10-PCS | Mod: 26,,, | Performed by: RADIOLOGY

## 2021-10-05 PROCEDURE — 3008F PR BODY MASS INDEX (BMI) DOCUMENTED: ICD-10-PCS | Mod: CPTII,S$GLB,, | Performed by: PHYSICIAN ASSISTANT

## 2021-10-05 PROCEDURE — 3079F DIAST BP 80-89 MM HG: CPT | Mod: CPTII,S$GLB,, | Performed by: PHYSICIAN ASSISTANT

## 2021-10-05 PROCEDURE — 3075F SYST BP GE 130 - 139MM HG: CPT | Mod: CPTII,S$GLB,, | Performed by: PHYSICIAN ASSISTANT

## 2021-10-05 PROCEDURE — 1159F MED LIST DOCD IN RCRD: CPT | Mod: CPTII,S$GLB,, | Performed by: PHYSICIAN ASSISTANT

## 2021-10-05 PROCEDURE — 3075F PR MOST RECENT SYSTOLIC BLOOD PRESS GE 130-139MM HG: ICD-10-PCS | Mod: CPTII,S$GLB,, | Performed by: PHYSICIAN ASSISTANT

## 2021-10-05 PROCEDURE — 73521 X-RAY EXAM HIPS BI 2 VIEWS: CPT | Mod: 26,,, | Performed by: RADIOLOGY

## 2021-10-05 PROCEDURE — 99204 PR OFFICE/OUTPT VISIT, NEW, LEVL IV, 45-59 MIN: ICD-10-PCS | Mod: S$GLB,,, | Performed by: PHYSICIAN ASSISTANT

## 2021-10-05 PROCEDURE — 3079F PR MOST RECENT DIASTOLIC BLOOD PRESSURE 80-89 MM HG: ICD-10-PCS | Mod: CPTII,S$GLB,, | Performed by: PHYSICIAN ASSISTANT

## 2021-10-05 PROCEDURE — 73521 X-RAY EXAM HIPS BI 2 VIEWS: CPT | Mod: TC

## 2021-10-05 PROCEDURE — 1160F PR REVIEW ALL MEDS BY PRESCRIBER/CLIN PHARMACIST DOCUMENTED: ICD-10-PCS | Mod: CPTII,S$GLB,, | Performed by: PHYSICIAN ASSISTANT

## 2021-10-05 PROCEDURE — 99999 PR PBB SHADOW E&M-EST. PATIENT-LVL III: CPT | Mod: PBBFAC,,, | Performed by: PHYSICIAN ASSISTANT

## 2021-10-05 PROCEDURE — 1159F PR MEDICATION LIST DOCUMENTED IN MEDICAL RECORD: ICD-10-PCS | Mod: CPTII,S$GLB,, | Performed by: PHYSICIAN ASSISTANT

## 2021-10-05 PROCEDURE — 99204 OFFICE O/P NEW MOD 45 MIN: CPT | Mod: S$GLB,,, | Performed by: PHYSICIAN ASSISTANT

## 2021-10-05 PROCEDURE — 1160F RVW MEDS BY RX/DR IN RCRD: CPT | Mod: CPTII,S$GLB,, | Performed by: PHYSICIAN ASSISTANT

## 2021-10-05 RX ORDER — MELOXICAM 15 MG/1
15 TABLET ORAL DAILY
Qty: 30 TABLET | Refills: 0 | Status: SHIPPED | OUTPATIENT
Start: 2021-10-05 | End: 2021-11-04

## 2021-10-06 ENCOUNTER — CLINICAL SUPPORT (OUTPATIENT)
Dept: REHABILITATION | Facility: HOSPITAL | Age: 46
End: 2021-10-06
Payer: COMMERCIAL

## 2021-10-06 DIAGNOSIS — M25.559 HIP PAIN: ICD-10-CM

## 2021-10-06 DIAGNOSIS — S32.401A CLOSED NONDISPLACED FRACTURE OF RIGHT ACETABULUM, UNSPECIFIED PORTION OF ACETABULUM, INITIAL ENCOUNTER: ICD-10-CM

## 2021-10-06 DIAGNOSIS — M25.651 DECREASED RANGE OF RIGHT HIP MOVEMENT: ICD-10-CM

## 2021-10-06 PROBLEM — M25.659 DECREASED RANGE OF MOTION OF HIP: Status: ACTIVE | Noted: 2021-10-06

## 2021-10-06 PROCEDURE — 97140 MANUAL THERAPY 1/> REGIONS: CPT | Performed by: PHYSICAL THERAPIST

## 2021-10-06 PROCEDURE — 97110 THERAPEUTIC EXERCISES: CPT | Performed by: PHYSICAL THERAPIST

## 2021-10-06 PROCEDURE — 97162 PT EVAL MOD COMPLEX 30 MIN: CPT | Performed by: PHYSICAL THERAPIST

## 2021-12-06 ENCOUNTER — PATIENT MESSAGE (OUTPATIENT)
Dept: SPORTS MEDICINE | Facility: CLINIC | Age: 46
End: 2021-12-06
Payer: COMMERCIAL

## 2021-12-06 DIAGNOSIS — M25.552 BILATERAL HIP PAIN: Primary | ICD-10-CM

## 2021-12-06 DIAGNOSIS — M25.551 BILATERAL HIP PAIN: Primary | ICD-10-CM

## 2021-12-07 RX ORDER — MELOXICAM 15 MG/1
15 TABLET ORAL DAILY
Qty: 30 TABLET | Refills: 0 | Status: SHIPPED | OUTPATIENT
Start: 2021-12-07 | End: 2022-01-06

## 2022-09-12 ENCOUNTER — TELEPHONE (OUTPATIENT)
Dept: SPORTS MEDICINE | Facility: CLINIC | Age: 47
End: 2022-09-12
Payer: COMMERCIAL

## 2022-09-12 NOTE — TELEPHONE ENCOUNTER
----- Message from Paula Laurent sent at 9/12/2022 12:53 PM CDT -----  Regarding: aj lezama, see below  Contact: [phone]  Sending referral and info today, please be on the look out for, said one was sent 9/1 but dont see uploaded yet    Please reach out to patient to schedule at your earliest convenience @# [phone]    
I called Shireen Augustus to schedule an appointment. Patient didn't answer and I left message that  the PA Anant Thompson could see him this week..  
2019

## 2022-09-20 ENCOUNTER — TELEPHONE (OUTPATIENT)
Dept: SPORTS MEDICINE | Facility: CLINIC | Age: 47
End: 2022-09-20
Payer: COMMERCIAL

## 2022-09-20 NOTE — TELEPHONE ENCOUNTER
I called the patient after receiving a referral for him to be seen for evaluation of right hip pain. Patient stated that he was not interested in scheduling an appointment and that he had already scheduled with another provider.

## 2023-03-27 ENCOUNTER — OFFICE VISIT (OUTPATIENT)
Dept: GASTROENTEROLOGY | Facility: CLINIC | Age: 48
End: 2023-03-27
Payer: COMMERCIAL

## 2023-03-27 VITALS
HEIGHT: 71 IN | HEART RATE: 77 BPM | BODY MASS INDEX: 36.12 KG/M2 | DIASTOLIC BLOOD PRESSURE: 102 MMHG | SYSTOLIC BLOOD PRESSURE: 148 MMHG | WEIGHT: 258 LBS

## 2023-03-27 DIAGNOSIS — Z12.11 SCREENING FOR MALIGNANT NEOPLASM OF COLON: Primary | ICD-10-CM

## 2023-03-27 PROCEDURE — 3008F BODY MASS INDEX DOCD: CPT | Mod: CPTII,S$GLB,, | Performed by: NURSE PRACTITIONER

## 2023-03-27 PROCEDURE — 3077F SYST BP >= 140 MM HG: CPT | Mod: CPTII,S$GLB,, | Performed by: NURSE PRACTITIONER

## 2023-03-27 PROCEDURE — 99999 PR PBB SHADOW E&M-EST. PATIENT-LVL IV: CPT | Mod: PBBFAC,,, | Performed by: NURSE PRACTITIONER

## 2023-03-27 PROCEDURE — 99203 PR OFFICE/OUTPT VISIT, NEW, LEVL III, 30-44 MIN: ICD-10-PCS | Mod: S$GLB,,, | Performed by: NURSE PRACTITIONER

## 2023-03-27 PROCEDURE — 99203 OFFICE O/P NEW LOW 30 MIN: CPT | Mod: S$GLB,,, | Performed by: NURSE PRACTITIONER

## 2023-03-27 PROCEDURE — 1160F PR REVIEW ALL MEDS BY PRESCRIBER/CLIN PHARMACIST DOCUMENTED: ICD-10-PCS | Mod: CPTII,S$GLB,, | Performed by: NURSE PRACTITIONER

## 2023-03-27 PROCEDURE — 1159F PR MEDICATION LIST DOCUMENTED IN MEDICAL RECORD: ICD-10-PCS | Mod: CPTII,S$GLB,, | Performed by: NURSE PRACTITIONER

## 2023-03-27 PROCEDURE — 3080F PR MOST RECENT DIASTOLIC BLOOD PRESSURE >= 90 MM HG: ICD-10-PCS | Mod: CPTII,S$GLB,, | Performed by: NURSE PRACTITIONER

## 2023-03-27 PROCEDURE — 1159F MED LIST DOCD IN RCRD: CPT | Mod: CPTII,S$GLB,, | Performed by: NURSE PRACTITIONER

## 2023-03-27 PROCEDURE — 99999 PR PBB SHADOW E&M-EST. PATIENT-LVL IV: ICD-10-PCS | Mod: PBBFAC,,, | Performed by: NURSE PRACTITIONER

## 2023-03-27 PROCEDURE — 3008F PR BODY MASS INDEX (BMI) DOCUMENTED: ICD-10-PCS | Mod: CPTII,S$GLB,, | Performed by: NURSE PRACTITIONER

## 2023-03-27 PROCEDURE — 1160F RVW MEDS BY RX/DR IN RCRD: CPT | Mod: CPTII,S$GLB,, | Performed by: NURSE PRACTITIONER

## 2023-03-27 PROCEDURE — 3077F PR MOST RECENT SYSTOLIC BLOOD PRESSURE >= 140 MM HG: ICD-10-PCS | Mod: CPTII,S$GLB,, | Performed by: NURSE PRACTITIONER

## 2023-03-27 PROCEDURE — 3080F DIAST BP >= 90 MM HG: CPT | Mod: CPTII,S$GLB,, | Performed by: NURSE PRACTITIONER

## 2023-03-27 RX ORDER — TESTOSTERONE CYPIONATE 200 MG/ML
200 INJECTION, SOLUTION INTRAMUSCULAR
COMMUNITY
Start: 2023-03-20

## 2023-03-27 RX ORDER — SYRINGE WITH NEEDLE, 1 ML 25GX5/8"
SYRINGE, EMPTY DISPOSABLE MISCELLANEOUS
COMMUNITY
Start: 2023-03-20

## 2023-03-27 RX ORDER — SODIUM, POTASSIUM,MAG SULFATES 17.5-3.13G
1 SOLUTION, RECONSTITUTED, ORAL ORAL DAILY
Qty: 1 KIT | Refills: 0 | Status: SHIPPED | OUTPATIENT
Start: 2023-03-27 | End: 2023-03-29

## 2023-03-27 RX ORDER — SERTRALINE HYDROCHLORIDE 50 MG/1
50 TABLET, FILM COATED ORAL
COMMUNITY

## 2023-03-27 NOTE — PROGRESS NOTES
"Subjective:       Patient ID: Eliel Coffman is a 47 y.o. male.    Chief Complaint: Colonoscopy    48 y/o male presents to clinic to discuss initial colonoscopy. Patient reports no concerning issues today. No chest or abdominal pain, n/v, constipation or diarrhea. No FH colon cancer; father with prostate cancer.       Past Medical History:   Diagnosis Date    Testicular failure        History reviewed. No pertinent surgical history.    Family History   Problem Relation Age of Onset    Diabetes Mother     Prostate cancer Father        Social History     Socioeconomic History    Marital status:    Tobacco Use    Smoking status: Former    Smokeless tobacco: Never   Substance and Sexual Activity    Alcohol use: Yes    Drug use: No       Review of Systems   Constitutional:  Negative for appetite change and unexpected weight change.   HENT:  Negative for trouble swallowing.    Respiratory:  Negative for shortness of breath.    Cardiovascular:  Negative for chest pain.   Gastrointestinal:  Negative for abdominal pain, blood in stool, constipation and diarrhea.   Hematological:  Negative for adenopathy. Does not bruise/bleed easily.   Psychiatric/Behavioral:  Negative for dysphoric mood.        Objective:     Vitals:    03/27/23 1338   BP: (!) 148/102   BP Location: Right arm   Patient Position: Sitting   BP Method: Large (Automatic)   Pulse: 77   Weight: 117 kg (258 lb)   Height: 5' 11" (1.803 m)          Physical Exam  Constitutional:       General: He is not in acute distress.     Appearance: Normal appearance.   HENT:      Head: Normocephalic.   Eyes:      Conjunctiva/sclera: Conjunctivae normal.      Pupils: Pupils are equal, round, and reactive to light.   Pulmonary:      Effort: Pulmonary effort is normal. No respiratory distress.   Musculoskeletal:         General: Normal range of motion.      Cervical back: Normal range of motion.   Skin:     General: Skin is warm and dry.   Neurological:      Mental Status: " "He is alert and oriented to person, place, and time.   Psychiatric:         Mood and Affect: Mood normal.         Behavior: Behavior normal.             Assessment:         ICD-10-CM ICD-9-CM   1. Screening for malignant neoplasm of colon  Z12.11 V76.51       Plan:       Screening for malignant neoplasm of colon  -     Case Request Endoscopy: COLONOSCOPY  -     sodium,potassium,mag sulfates (SUPREP BOWEL PREP KIT) 17.5-3.13-1.6 gram SolR; Take 177 mLs by mouth once daily. for 2 days  Dispense: 1 kit; Refill: 0      Follow up if symptoms worsen or fail to improve.     Patient's Medications   New Prescriptions    SODIUM,POTASSIUM,MAG SULFATES (SUPREP BOWEL PREP KIT) 17.5-3.13-1.6 GRAM SOLR    Take 177 mLs by mouth once daily. for 2 days   Previous Medications    BD LUER-ELIZABETH SYRINGE 3 ML 20 GAUGE X 1" SYRG    USE WITH TESTOSTERONE    SERTRALINE (ZOLOFT) 50 MG TABLET    Take 50 mg by mouth.    TESTOSTERONE (ANDROGEL) 20.25 MG/1.25 GRAM (1.62 %) [facility]    Apply 2 pumps to shoulders daily    TESTOSTERONE CYPIONATE (DEPOTESTOTERONE CYPIONATE) 200 MG/ML INJECTION    Inject 200 mg into the muscle every 7 days.   Modified Medications    No medications on file   Discontinued Medications    CLOMIPHENE (CLOMID) 50 MG TABLET    Take 1/2 PO QD    INDOMETHACIN (INDOCIN) 50 MG CAPSULE    TK ONE C PO BID             "

## 2023-03-27 NOTE — PATIENT INSTRUCTIONS
SUPREP Instructions    [facility] [address]  [address]    You are scheduled for a colonoscopy with Dr. Eubanks on 4/6/2023 at [facility].  To ensure that your test is accurate and complete, you MUST follow these instructions listed below.  If you have any questions, please call our office at [phone].  Plan on being at the hospital for your procedure for 3-4 hours.    1.  Follow a CLEAR LIQUID DIET for the entire day before your scheduled colonoscopy.  This means no solid food the entire day starting when you wake.  You may have as much of the clear liquids as you want throughout the day.   CLEAR LIQUID DIET:   - Avoid Red, Orange, Purple, and/or Blue food coloring   - NO DAIRY   - You can have:  Coffee with sugar (no creamer), tea, water, soda, apple or white grape juice, chicken or beef broth/bouillon (no meat, noodles, or veggies), green/yellow popsicles, green/yellow Jell-O, lemonade.    2.  AT 5 pm the evening before your colonoscopy, POUR ONE (1) BOTTLE OF SUPREP INTO THE MIXING CONTAINER, PROVIDED INSIDE THE BOX.  ADD WATER TO THE LINE ON THE CONTAINER AND MIX IT WELL.  DRINK THE ENTIRE CONTAINER AND THEN DRINK TWO (2) MORE CONTAINERS OF WATER OVER THE NEXT 1 HOUR.  This is sometimes easier to drink if this solution is cold, so you can mix the solution 20 minutes ahead of time and place in the refrigerator prior to drinking.  You have to drink the solution within 30-45 minutes of mixing it.  Do NOT put this solution over ice.  It IS ok to drink with a straw.    3.  The endoscopy department will call you 1 day before your colonoscopy to tell you the exact time to arrive, AND to tell you the exact time to drink the 2nd portion of your prep (which will be FIVE HOURS BEFORE YOUR ARRIVAL TIME).  At this time given to you, POUR ONE (1) BOTTLE OF SUPREP INTO THE MIXING CONTAINER, PROVIDED INSIDE THE BOX.  ADD WATER TO THE LINE ON THE CONTAINER AND MIX IT  WELL.  DRINK THE ENTIRE CONTAINER AND THEN DRINK TWO (2) MORE CONTAINERS OF WATER OVER THE NEXT 1 HOUR.  This is sometimes easier to drink if this solution is cold, so you can mix the solution 20 minutes ahead of time and place in the refrigerator prior to drinking.  You have to drink the solution within 30-45 minutes of mixing it.  Do NOT put this solution over ice.  It IS ok to drink with a straw.  Once this is complete, you may not have ANYTHING else by mouth!    4.  You must have someone with you to DRIVE YOU HOME since you will be receiving IV sedation for the colonoscopy.    5.  It is ok to take MOST of your REGULAR MEDICATIONS  in the morning of your test with a SIP of water.  THE ONLY MEDS YOU NEED TO HOLD ARE YOUR DIABETES MEDICATIONS,  SOME BLOOD PRESSURE MEDS, AND BLOOD THINNERS IF OK'D BY YOUR DOCTOR.  Do NOT have anything else to eat or drink the morning of your colonoscopy.  It is ok to brush your teeth.    6.  If you are on blood thinners THAT YOU HAVE BEEN INSTRUCTED TO HOLD BY YOUR DOCTOR FOR THIS PROCEDURE, then do NOT take this the morning of your colonoscopy.  Do NOT stop these medications on your own, they must be approved to be held by your doctor.  Your colonoscopy can NOT be done if you are on these medications.  Examples of blood thinners include: Coumadin, Aggrenox, Plavix, Pradaxa, Reapro, Pletal, Xarelto, Ticagrelor, Brilinta, Eliquis, and high dose aspirin (325 mg).  You do not have to stop baby aspirin 81 mg.    7.  IF YOU ARE DIABETIC:  NO INSULIN OR ORAL MEDICATIONS THE MORNING OF THE COLONOSCOPY.  TAKE ONLY HALF THE DOSE OF YOUR INSULIN THE DAY BEFORE THE COLONOSCOPY.  DO NOT TAKE ANY ORAL DIABETIC MEDICATIONS THE DAY BEFORE THE COLONOSCOPY.  IF YOU ARE AN INSULIN DEPENDENT DIABETIC WITH UNSTABLE BLOOD SUGARS, NOTIFY YOUR PRIMARY CARE PHYSICIAN FOR INSTRUCTIONS.    You will receive a call a few days before your colonoscopy to tell you the time to arrive.  If you have not received a  call by the day before your procedure, call the Pre-op Coordinator at [phone].

## 2023-04-06 PROBLEM — Z86.010 PERSONAL HISTORY OF COLONIC POLYPS: Status: ACTIVE | Noted: 2023-04-06

## 2023-04-06 PROBLEM — Z86.0100 PERSONAL HISTORY OF COLONIC POLYPS: Status: ACTIVE | Noted: 2023-04-06

## 2023-04-06 PROBLEM — Z12.11 SCREENING FOR MALIGNANT NEOPLASM OF COLON: Status: RESOLVED | Noted: 2023-03-27 | Resolved: 2023-04-06

## 2024-06-07 ENCOUNTER — OFFICE VISIT (OUTPATIENT)
Dept: SPORTS MEDICINE | Facility: CLINIC | Age: 49
End: 2024-06-07
Payer: COMMERCIAL

## 2024-06-07 ENCOUNTER — HOSPITAL ENCOUNTER (OUTPATIENT)
Dept: RADIOLOGY | Facility: HOSPITAL | Age: 49
Discharge: HOME | End: 2024-06-07
Attending: PHYSICIAN ASSISTANT
Payer: COMMERCIAL

## 2024-06-07 VITALS
SYSTOLIC BLOOD PRESSURE: 117 MMHG | HEIGHT: 71 IN | BODY MASS INDEX: 34.41 KG/M2 | WEIGHT: 245.81 LBS | DIASTOLIC BLOOD PRESSURE: 74 MMHG | HEART RATE: 57 BPM

## 2024-06-07 DIAGNOSIS — M16.11 PRIMARY OSTEOARTHRITIS OF RIGHT HIP: Primary | ICD-10-CM

## 2024-06-07 DIAGNOSIS — M25.561 RIGHT KNEE PAIN, UNSPECIFIED CHRONICITY: ICD-10-CM

## 2024-06-07 DIAGNOSIS — M25.551 RIGHT HIP PAIN: ICD-10-CM

## 2024-06-07 PROCEDURE — 3078F DIAST BP <80 MM HG: CPT | Mod: CPTII,S$GLB,, | Performed by: PHYSICIAN ASSISTANT

## 2024-06-07 PROCEDURE — 99999 PR PBB SHADOW E&M-EST. PATIENT-LVL IV: CPT | Mod: PBBFAC,,, | Performed by: PHYSICIAN ASSISTANT

## 2024-06-07 PROCEDURE — 99215 OFFICE O/P EST HI 40 MIN: CPT | Mod: S$GLB,,, | Performed by: PHYSICIAN ASSISTANT

## 2024-06-07 PROCEDURE — 3074F SYST BP LT 130 MM HG: CPT | Mod: CPTII,S$GLB,, | Performed by: PHYSICIAN ASSISTANT

## 2024-06-07 PROCEDURE — 1159F MED LIST DOCD IN RCRD: CPT | Mod: CPTII,S$GLB,, | Performed by: PHYSICIAN ASSISTANT

## 2024-06-07 PROCEDURE — 3008F BODY MASS INDEX DOCD: CPT | Mod: CPTII,S$GLB,, | Performed by: PHYSICIAN ASSISTANT

## 2024-06-07 PROCEDURE — 1160F RVW MEDS BY RX/DR IN RCRD: CPT | Mod: CPTII,S$GLB,, | Performed by: PHYSICIAN ASSISTANT

## 2024-06-07 RX ORDER — CELECOXIB 200 MG/1
200 CAPSULE ORAL 2 TIMES DAILY
Qty: 60 CAPSULE | Refills: 2 | Status: SHIPPED | OUTPATIENT
Start: 2024-06-07

## 2024-06-07 NOTE — PROGRESS NOTES
Subjective:     Chief Complaint: Eliel Coffman is a 48 y.o. male who had concerns including Pain of the Right Hip.    Patient who works as a [employer]  presents to clinic with chronic right hip pain x approximately 3 years. He is here today to discuss total hip replacement. He was seen in our clinic 3 years ago by Brijesh Pedraza for his right hip pain. He reports progressively worsening right hip pain. Pain at rest is 4/10. Pain at its worst is 8-9/10. He has taken Ibuprofen and Mobic as needed for pain with minimal relief. He has recently been seen at [facility] by Dr. Art Lin and recommended a MARCELINO. However, patient prefers to have surgery with Dr. Patino because he does the anterior approach with total hip replacements. He is interested in scheduling surgery.    Pain  Pertinent negatives include no abdominal pain, chest pain, chills, congestion, coughing, fever, headaches, joint swelling, myalgias, nausea, numbness, rash, sore throat or vomiting.       Review of Systems   Constitutional: Negative. Negative for chills, fever, weight gain and weight loss.   HENT:  Negative for congestion and sore throat.    Eyes:  Negative for blurred vision and double vision.   Cardiovascular:  Negative for chest pain, leg swelling and palpitations.   Respiratory:  Negative for cough and shortness of breath.    Hematologic/Lymphatic: Does not bruise/bleed easily.   Skin:  Negative for itching, poor wound healing and rash.   Musculoskeletal:  Positive for joint pain. Negative for back pain, joint swelling, muscle weakness, myalgias and stiffness.   Gastrointestinal:  Negative for abdominal pain, constipation, diarrhea, nausea and vomiting.   Genitourinary: Negative.  Negative for frequency and hematuria.   Neurological:  Negative for dizziness, headaches, numbness, paresthesias and sensory change.   Psychiatric/Behavioral:  Negative for altered mental status and depression. The patient is not  nervous/anxious.    Allergic/Immunologic: Negative for hives.       Pain Related Questions  Over the past 3 days, what was your highest pain level?: 4  Over the past 3 days, what was your lowest pain level? : 4    Other  How many nights a week are you awakened by your affected body part?: 4  Was the patient's HEIGHT measured or patient reported?: Patient Reported  Was the patient's WEIGHT measured or patient reported?: Measured    Objective:     General: Eliel is well-developed, well-nourished, appears stated age, in no acute distress, alert and oriented to time, place and person.     General    Vitals reviewed.  Constitutional: He is oriented to person, place, and time. He appears well-developed and well-nourished. No distress.   HENT:   Head: Normocephalic and atraumatic.   Eyes: EOM are normal.   Cardiovascular:  Normal rate and regular rhythm.            Pulmonary/Chest: Effort normal.   Neurological: He is alert and oriented to person, place, and time.   Psychiatric: He has a normal mood and affect. His behavior is normal. Thought content normal.     General Musculoskeletal Exam   Gait: normal   Pelvic Obliquity: none      Right Knee Exam     Inspection   Alignment:  normal  Effusion: absent    Left Knee Exam     Inspection   Alignment:  normal  Effusion: absent    Right Hip Exam     Inspection   Scars: absent  Swelling: absent  Bruising: absent  No deformity of hip.  Quadriceps Atrophy:  Negative  Erythema: absent    Range of Motion   Abduction:  20 abnormal   Adduction:  10 abnormal   Extension:  0 normal   Flexion:  80 abnormal   External rotation:  20 abnormal   Internal rotation:  0 abnormal     Tests   Pain w/ forced internal rotation (SKIP): present  Pain w/ forced external rotation (FADIR): present  Jaciel: negative  Trendelenburg Test: negative  Circumduction test: positive  Stinchfield test: negative  Log Roll: positive  Step-down test: negative  Resisted sit-up pain: negative    Other   Sensation:  normal  Left Hip Exam     Inspection   Scars: absent  Swelling: absent  No deformity of hip.  Quadriceps Atrophy:  negative  Erythema: absent  Bruising: absent    Range of Motion   Abduction:  30 normal   Adduction:  20 normal   Extension:  0 normal   Flexion:  110 normal   External rotation:  40 normal   Internal rotation: 10 normal     Muscle Strength   The patient has normal left hip strength.     Tests   Pain w/ forced internal rotation (SKIP): absent  Pain w/ forced external rotation (FADIR): absent  Jaciel: negative  Trendelenburg Test: negative  Circumduction test: negative  Stinchfield test: negative  Log Roll: negative  Step-down test: negative  Resisted sit-up pain: negative    Other   Sensation: normal      Back (L-Spine & T-Spine) / Neck (C-Spine) Exam   Back exam is normal.      Muscle Strength   Right Lower Extremity   Hip Abduction: 5/5   Hip Adduction: 5/5   Hip Flexion: 4/5 (pain)   Left Lower Extremity   Hip Abduction: 5/5   Hip Adduction: 5/5   Hip Flexion: 5/5     Reflexes     Left Side  Achilles:  2+  Quadriceps:  2+    Right Side   Achilles:  2+  Quadriceps:  2+      Outside RADIOGRAPHS from [facility] are being uploaded into chart:  Bilateral hips:  On my review, DJD present in bilateral hips, right greater than left. Soft tissue calcification focus adjacent to the right superior and lateral hip joint space.     Assessment:     Encounter Diagnoses   Name Primary?    Right hip pain     Primary osteoarthritis of right hip Yes        Plan:     1. Radiographs reviewed today and sent to Merlyn Patino MD for review. We reviewed with Eliel Coffman today, the pathology and natural history of his diagnosis. We have discussed a variety of treatment options including medications, physical therapy and other alternative treatments. I also explained the indications, risks and benefits of surgery. After discussion, he decided to proceed with surgery. The decision was made to go forward with:  right  Hip:    [address] Replacement, hip, acetabular and proximal femoral prosthesis (Total Hip Arthroplasty), with or without autograft or allograft      Clearance Medically Necessary: Yes     Pre-Op Center Clearance Medically Necessary: Yes     Post op PT at [facility] 2nd floor       Patient will meet Dr. Patino at pre-op appt. Surgery scheduled for 8/6/24.   Joint class scheduled, booking sheet filled out, pre-op center message sent, and pre-op appt scheduled.    The details of the surgical procedure were explained, including the location of probable incisions and a description of likely hardware and/or grafts to be used.  The patient understands the likely convalescence after surgery.  Also, we have thoroughly discussed the risks, benefits and alternatives to surgery, including, but not limited to, the risk of infection, joint stiffness, blood clot (including DVT and/or pulmonary embolus), neurologic and vascular injury.  It was explained that, if tissue has been repaired or reconstructed, there is a chance of failure, which may require further management.    I made the decision to obtain old records of the patient including previous notes and imaging. I independently reviewed and interpreted lab results today as well as prior imaging. Reviewed with patient in detail.    2. Ice compress to the affected area 2-3x a day for 15-20 minutes as needed for pain management.  3. Ambulatory referral to physical therapy for pre-habilitation.[facility]  4. Celebrex 200 mg BID daily PRN for pain management.  5. Rodriguez Hip Score was not filled out today in clinic. Patient will fill out Rodriguez Hip Score on return.         Patient questionnaires may have been collected.

## 2024-06-17 ENCOUNTER — TELEPHONE (OUTPATIENT)
Dept: PREADMISSION TESTING | Facility: HOSPITAL | Age: 49
End: 2024-06-17
Payer: COMMERCIAL

## 2024-06-17 NOTE — TELEPHONE ENCOUNTER
----- Message from Airam Bautista sent at 6/10/2024  8:39 AM CDT -----  Regarding: PCP clearance for [facility] surgical patient-Dr. Merlyn Patino  Hi,        Would you please reach out and schedule PCP clearance appointment with Dr. Santiago or either his NPs (Melissa Fermin & Hoang Hernandez) for  surgical patient dos 8/6/2024.       Thank you so much,    Airam Bautista   Clinical Assistant to Dr. Merlyn Patino

## 2024-06-17 NOTE — TELEPHONE ENCOUNTER
Please notify T is low.  Begin clomid.  Recheck T in 1 month.  Would hold off on doing a SA for 3 more months.  RTC with a SA x 2 in 3 months.      Please notify that we need to do 2 more SA's as his 1st SA was compromised by recent testosterone use.  
Pt notified of results and need to take clomid. Lab appt scheduled to repeat t level in one month.  Clomid 50 mg tablet, take 1/2 tablet by mouth daily. Dispense #15 with 5 refills called in to pts pharmacy [facility] pharmacy. Pt to provide x2 sa in 3 months. Pt verbalized understanding of instructions.  
Universal Safety Interventions

## 2024-07-02 ENCOUNTER — PATIENT MESSAGE (OUTPATIENT)
Dept: SPORTS MEDICINE | Facility: CLINIC | Age: 49
End: 2024-07-02
Payer: COMMERCIAL

## 2024-07-18 DIAGNOSIS — M25.551 RIGHT HIP PAIN: Primary | ICD-10-CM

## 2024-07-19 ENCOUNTER — TELEPHONE (OUTPATIENT)
Dept: PREADMISSION TESTING | Facility: HOSPITAL | Age: 49
End: 2024-07-19

## 2024-07-19 NOTE — ANESTHESIA PAT ROS NOTE
07/19/2024  Eliel Coffman is a 49 y.o., male.      Pre-op Assessment          Review of Systems           Anesthesia Assessment: Preoperative EQUATION    Planned Procedure: Procedure(s) (LRB):  ARTHROPLASTY, HIP (Right)  Requested Anesthesia Type:General  Surgeon: Merlyn Patino MD  Service: Orthopedics  Known or anticipated Date of Surgery:8/6/2024    Surgeon notes: reviewed    Electronic QUestionnaire Assessment completed via nurse interview with patient.        Triage considerations:     The patient has no apparent active cardiac condition (No unstable coronary Syndrome such as severe unstable angina or recent [<1 month] myocardial infarction, decompensated CHF, severe valvular   disease or significant arrhythmia)    Previous anesthesia records:MAC  4/6/23  COLONOSCOPY (Abdomen)   Airway:  Mallampati: II   Mouth Opening: Normal  TM Distance: Normal  Tongue: Normal  Neck ROM: Normal ROM    Last PCP note: outside [facility]      note not available  Subspecialty notes: Ortho  Sports Med      TRISTIN Serrano FNP    Other important co-morbidities: Morbid Obesity and JING, Hypogonadism      Tests already available:  Available tests,  outside [facility] .  12/29/22      CE    Cardiac Event moniter  1//14/22       os     Complete Nuclear Stress                                   CXR             Instructions given. (See in Nurse's note)    Optimization:  Anesthesia Preop Clinic Assessment  Indicated  Will schedule POC.    Medical Opinion Indicated       Sub-specialist consult indicated:   [facility] Pre op Center NP.         Plan:    Testing:  CBC, CMP, EKG, and PT/INR   Pre-anesthesia  visit       Visit focus: possible regional anesthesia and/or nerve block      Consultation: . [facility] NP for medical and anesthesia optimization.     Patient  has previously scheduled Medical Appointment:   7/22    Navigation: Tests Scheduled.              Consults scheduled.             Results will be tracked by Preop Clinic.   7/23/24  Patient is optimized     I spent a total of 30 minutes on the day of the visit.  This includes face to face time and non-face to face time preparing to see the patient (eg, review of tests), obtaining and/or reviewing separately obtained history, documenting clinical information in the electronic or other health record, independently interpreting results and communicating results to the patient/family/caregiver, or care coordinator.        Hoang Chávez NP  Perioperative Medicine  [facility]   Pager [phone]

## 2024-07-22 ENCOUNTER — OFFICE VISIT (OUTPATIENT)
Dept: INTERNAL MEDICINE | Facility: CLINIC | Age: 49
End: 2024-07-22
Payer: COMMERCIAL

## 2024-07-22 ENCOUNTER — HOSPITAL ENCOUNTER (OUTPATIENT)
Dept: CARDIOLOGY | Facility: CLINIC | Age: 49
Discharge: HOME | End: 2024-07-22
Payer: COMMERCIAL

## 2024-07-22 ENCOUNTER — OFFICE VISIT (OUTPATIENT)
Dept: SPORTS MEDICINE | Facility: CLINIC | Age: 49
End: 2024-07-22
Payer: COMMERCIAL

## 2024-07-22 ENCOUNTER — LAB VISIT (OUTPATIENT)
Dept: LAB | Facility: HOSPITAL | Age: 49
End: 2024-07-22
Attending: ANESTHESIOLOGY
Payer: COMMERCIAL

## 2024-07-22 VITALS
TEMPERATURE: 98 F | OXYGEN SATURATION: 95 % | WEIGHT: 250 LBS | SYSTOLIC BLOOD PRESSURE: 126 MMHG | HEART RATE: 69 BPM | BODY MASS INDEX: 35 KG/M2 | HEIGHT: 71 IN | DIASTOLIC BLOOD PRESSURE: 80 MMHG

## 2024-07-22 VITALS
DIASTOLIC BLOOD PRESSURE: 79 MMHG | WEIGHT: 250 LBS | BODY MASS INDEX: 34.87 KG/M2 | HEART RATE: 82 BPM | SYSTOLIC BLOOD PRESSURE: 124 MMHG

## 2024-07-22 DIAGNOSIS — Z01.818 PRE-OP TESTING: ICD-10-CM

## 2024-07-22 DIAGNOSIS — E29.1 MALE HYPOGONADISM: Primary | ICD-10-CM

## 2024-07-22 DIAGNOSIS — G47.33 OSA (OBSTRUCTIVE SLEEP APNEA): ICD-10-CM

## 2024-07-22 DIAGNOSIS — Z92.89 HISTORY OF NUCLEAR STRESS TEST: ICD-10-CM

## 2024-07-22 DIAGNOSIS — R74.01 ELEVATED AST (SGOT): ICD-10-CM

## 2024-07-22 DIAGNOSIS — M25.651 DECREASED RANGE OF RIGHT HIP MOVEMENT: ICD-10-CM

## 2024-07-22 DIAGNOSIS — M16.11 PRIMARY OSTEOARTHRITIS OF RIGHT HIP: Primary | ICD-10-CM

## 2024-07-22 DIAGNOSIS — M79.609 PAIN IN EXTREMITY, UNSPECIFIED EXTREMITY: ICD-10-CM

## 2024-07-22 LAB
ALBUMIN SERPL BCP-MCNC: 3.8 G/DL (ref 3.5–5.2)
ALP SERPL-CCNC: 64 U/L (ref 55–135)
ALT SERPL W/O P-5'-P-CCNC: 35 U/L (ref 10–44)
ANION GAP SERPL CALC-SCNC: 4 MMOL/L (ref 8–16)
AST SERPL-CCNC: 42 U/L (ref 10–40)
BASOPHILS # BLD AUTO: 0.05 K/UL (ref 0–0.2)
BASOPHILS NFR BLD: 0.6 % (ref 0–1.9)
BILIRUB SERPL-MCNC: 0.6 MG/DL (ref 0.1–1)
BUN SERPL-MCNC: 12 MG/DL (ref 6–20)
CALCIUM SERPL-MCNC: 9.2 MG/DL (ref 8.7–10.5)
CHLORIDE SERPL-SCNC: 106 MMOL/L (ref 95–110)
CO2 SERPL-SCNC: 28 MMOL/L (ref 23–29)
CREAT SERPL-MCNC: 0.9 MG/DL (ref 0.5–1.4)
DIFFERENTIAL METHOD BLD: ABNORMAL
EOSINOPHIL # BLD AUTO: 0.2 K/UL (ref 0–0.5)
EOSINOPHIL NFR BLD: 1.9 % (ref 0–8)
ERYTHROCYTE [DISTWIDTH] IN BLOOD BY AUTOMATED COUNT: 18 % (ref 11.5–14.5)
EST. GFR  (NO RACE VARIABLE): >60 ML/MIN/1.73 M^2
GLUCOSE SERPL-MCNC: 96 MG/DL (ref 70–110)
HCT VFR BLD AUTO: 52.6 % (ref 40–54)
HGB BLD-MCNC: 15.7 G/DL (ref 14–18)
IMM GRANULOCYTES # BLD AUTO: 0.06 K/UL (ref 0–0.04)
IMM GRANULOCYTES NFR BLD AUTO: 0.7 % (ref 0–0.5)
INR PPP: 1 (ref 0.8–1.2)
LYMPHOCYTES # BLD AUTO: 2.6 K/UL (ref 1–4.8)
LYMPHOCYTES NFR BLD: 31.2 % (ref 18–48)
MCH RBC QN AUTO: 23.6 PG (ref 27–31)
MCHC RBC AUTO-ENTMCNC: 29.8 G/DL (ref 32–36)
MCV RBC AUTO: 79 FL (ref 82–98)
MONOCYTES # BLD AUTO: 0.7 K/UL (ref 0.3–1)
MONOCYTES NFR BLD: 7.9 % (ref 4–15)
NEUTROPHILS # BLD AUTO: 4.7 K/UL (ref 1.8–7.7)
NEUTROPHILS NFR BLD: 57.7 % (ref 38–73)
NRBC BLD-RTO: 0 /100 WBC
OHS QRS DURATION: 106 MS
OHS QTC CALCULATION: 403 MS
PLATELET # BLD AUTO: 291 K/UL (ref 150–450)
PMV BLD AUTO: 9.8 FL (ref 9.2–12.9)
POTASSIUM SERPL-SCNC: 4.1 MMOL/L (ref 3.5–5.1)
PROT SERPL-MCNC: 7.5 G/DL (ref 6–8.4)
PROTHROMBIN TIME: 11.1 SEC (ref 9–12.5)
RBC # BLD AUTO: 6.65 M/UL (ref 4.6–6.2)
SODIUM SERPL-SCNC: 138 MMOL/L (ref 136–145)
WBC # BLD AUTO: 8.21 K/UL (ref 3.9–12.7)

## 2024-07-22 PROCEDURE — 3008F BODY MASS INDEX DOCD: CPT | Mod: CPTII,S$GLB,, | Performed by: PHYSICIAN ASSISTANT

## 2024-07-22 PROCEDURE — 3074F SYST BP LT 130 MM HG: CPT | Mod: CPTII,S$GLB,, | Performed by: PHYSICIAN ASSISTANT

## 2024-07-22 PROCEDURE — 3074F SYST BP LT 130 MM HG: CPT | Mod: CPTII,S$GLB,, | Performed by: NURSE PRACTITIONER

## 2024-07-22 PROCEDURE — 3008F BODY MASS INDEX DOCD: CPT | Mod: CPTII,S$GLB,, | Performed by: NURSE PRACTITIONER

## 2024-07-22 PROCEDURE — 99999 PR PBB SHADOW E&M-EST. PATIENT-LVL III: CPT | Mod: PBBFAC,,, | Performed by: NURSE PRACTITIONER

## 2024-07-22 PROCEDURE — 93005 ELECTROCARDIOGRAM TRACING: CPT | Mod: S$GLB,,, | Performed by: ANESTHESIOLOGY

## 2024-07-22 PROCEDURE — 3079F DIAST BP 80-89 MM HG: CPT | Mod: CPTII,S$GLB,, | Performed by: NURSE PRACTITIONER

## 2024-07-22 PROCEDURE — 85025 COMPLETE CBC W/AUTO DIFF WBC: CPT | Performed by: ANESTHESIOLOGY

## 2024-07-22 PROCEDURE — 1159F MED LIST DOCD IN RCRD: CPT | Mod: CPTII,S$GLB,, | Performed by: PHYSICIAN ASSISTANT

## 2024-07-22 PROCEDURE — 93010 ELECTROCARDIOGRAM REPORT: CPT | Mod: S$GLB,,, | Performed by: INTERNAL MEDICINE

## 2024-07-22 PROCEDURE — 99215 OFFICE O/P EST HI 40 MIN: CPT | Mod: S$GLB,,, | Performed by: PHYSICIAN ASSISTANT

## 2024-07-22 PROCEDURE — 99999 PR PBB SHADOW E&M-EST. PATIENT-LVL III: CPT | Mod: PBBFAC,,, | Performed by: PHYSICIAN ASSISTANT

## 2024-07-22 PROCEDURE — 1160F RVW MEDS BY RX/DR IN RCRD: CPT | Mod: CPTII,S$GLB,, | Performed by: PHYSICIAN ASSISTANT

## 2024-07-22 PROCEDURE — 36415 COLL VENOUS BLD VENIPUNCTURE: CPT | Performed by: ANESTHESIOLOGY

## 2024-07-22 PROCEDURE — 80053 COMPREHEN METABOLIC PANEL: CPT | Performed by: ANESTHESIOLOGY

## 2024-07-22 PROCEDURE — 3078F DIAST BP <80 MM HG: CPT | Mod: CPTII,S$GLB,, | Performed by: PHYSICIAN ASSISTANT

## 2024-07-22 PROCEDURE — 85610 PROTHROMBIN TIME: CPT | Performed by: ANESTHESIOLOGY

## 2024-07-22 PROCEDURE — 99203 OFFICE O/P NEW LOW 30 MIN: CPT | Mod: S$GLB,,, | Performed by: NURSE PRACTITIONER

## 2024-07-22 RX ORDER — OXYCODONE HYDROCHLORIDE 5 MG/1
5 TABLET ORAL
OUTPATIENT
Start: 2024-07-22

## 2024-07-22 RX ORDER — ASPIRIN 81 MG/1
81 TABLET ORAL 2 TIMES DAILY
OUTPATIENT
Start: 2024-07-22

## 2024-07-22 RX ORDER — CELECOXIB 200 MG/1
200 CAPSULE ORAL 2 TIMES DAILY
Qty: 60 CAPSULE | Refills: 2 | Status: SHIPPED | OUTPATIENT
Start: 2024-07-22

## 2024-07-22 RX ORDER — ASPIRIN 325 MG
325 TABLET, DELAYED RELEASE (ENTERIC COATED) ORAL DAILY
Qty: 42 TABLET | Refills: 0 | Status: SHIPPED | OUTPATIENT
Start: 2024-07-22 | End: 2024-09-02

## 2024-07-22 RX ORDER — ONDANSETRON HYDROCHLORIDE 2 MG/ML
4 INJECTION, SOLUTION INTRAVENOUS EVERY 8 HOURS PRN
OUTPATIENT
Start: 2024-07-22

## 2024-07-22 RX ORDER — CHOLECALCIFEROL (VITAMIN D3) 25 MCG
1000 TABLET ORAL DAILY
COMMUNITY

## 2024-07-22 RX ORDER — ACETAMINOPHEN 500 MG
1000 TABLET ORAL EVERY 6 HOURS
OUTPATIENT
Start: 2024-07-22

## 2024-07-22 RX ORDER — PREGABALIN 75 MG/1
75 CAPSULE ORAL
OUTPATIENT
Start: 2024-07-22

## 2024-07-22 RX ORDER — AMOXICILLIN 250 MG
1 CAPSULE ORAL 2 TIMES DAILY
OUTPATIENT
Start: 2024-07-22

## 2024-07-22 RX ORDER — POLYETHYLENE GLYCOL 3350 17 G/17G
17 POWDER, FOR SOLUTION ORAL DAILY PRN
OUTPATIENT
Start: 2024-07-22

## 2024-07-22 RX ORDER — FAMOTIDINE 20 MG/1
20 TABLET, FILM COATED ORAL 2 TIMES DAILY
OUTPATIENT
Start: 2024-07-22

## 2024-07-22 RX ORDER — CELECOXIB 200 MG/1
200 CAPSULE ORAL DAILY
OUTPATIENT
Start: 2024-07-22

## 2024-07-22 RX ORDER — METHOCARBAMOL 500 MG/1
500 TABLET, FILM COATED ORAL 3 TIMES DAILY
Qty: 30 TABLET | Refills: 0 | Status: SHIPPED | OUTPATIENT
Start: 2024-07-22 | End: 2024-08-01

## 2024-07-22 RX ORDER — ACETAMINOPHEN 500 MG
1000 TABLET ORAL
OUTPATIENT
Start: 2024-07-22

## 2024-07-22 RX ORDER — OXYCODONE HYDROCHLORIDE 5 MG/1
5 TABLET ORAL EVERY 6 HOURS PRN
Qty: 28 TABLET | Refills: 0 | Status: SHIPPED | OUTPATIENT
Start: 2024-07-22

## 2024-07-22 RX ORDER — LIDOCAINE HYDROCHLORIDE 10 MG/ML
1 INJECTION, SOLUTION EPIDURAL; INFILTRATION; INTRACAUDAL; PERINEURAL
OUTPATIENT
Start: 2024-07-22

## 2024-07-22 RX ORDER — NALOXONE HCL 0.4 MG/ML
0.02 VIAL (ML) INJECTION
OUTPATIENT
Start: 2024-07-22

## 2024-07-22 RX ORDER — MIDAZOLAM HYDROCHLORIDE 1 MG/ML
2 INJECTION, SOLUTION INTRAMUSCULAR; INTRAVENOUS
OUTPATIENT
Start: 2024-07-22 | End: 2024-07-23

## 2024-07-22 RX ORDER — CEFAZOLIN SODIUM 2 G/50ML
2 SOLUTION INTRAVENOUS
OUTPATIENT
Start: 2024-07-22 | End: 2024-07-23

## 2024-07-22 RX ORDER — PREGABALIN 75 MG/1
75 CAPSULE ORAL NIGHTLY
OUTPATIENT
Start: 2024-07-22

## 2024-07-22 RX ORDER — SODIUM CHLORIDE 9 MG/ML
INJECTION, SOLUTION INTRAVENOUS CONTINUOUS
OUTPATIENT
Start: 2024-07-22 | End: 2024-07-23

## 2024-07-22 RX ORDER — FENTANYL CITRATE 50 UG/ML
50 INJECTION, SOLUTION INTRAMUSCULAR; INTRAVENOUS
OUTPATIENT
Start: 2024-07-22 | End: 2024-07-23

## 2024-07-22 RX ORDER — CEFAZOLIN SODIUM 2 G/50ML
2 SOLUTION INTRAVENOUS
OUTPATIENT
Start: 2024-07-22

## 2024-07-22 RX ORDER — POLYETHYLENE GLYCOL 3350 17 G/17G
17 POWDER, FOR SOLUTION ORAL DAILY
OUTPATIENT
Start: 2024-07-22

## 2024-07-22 RX ORDER — FENTANYL CITRATE 50 UG/ML
25 INJECTION, SOLUTION INTRAMUSCULAR; INTRAVENOUS EVERY 5 MIN PRN
OUTPATIENT
Start: 2024-07-22

## 2024-07-22 RX ORDER — MORPHINE SULFATE 2 MG/ML
2 INJECTION, SOLUTION INTRAMUSCULAR; INTRAVENOUS
OUTPATIENT
Start: 2024-07-22

## 2024-07-22 RX ORDER — PROCHLORPERAZINE EDISYLATE 5 MG/ML
5 INJECTION INTRAMUSCULAR; INTRAVENOUS EVERY 6 HOURS PRN
OUTPATIENT
Start: 2024-07-22

## 2024-07-22 RX ORDER — SODIUM CHLORIDE 9 MG/ML
INJECTION, SOLUTION INTRAVENOUS
OUTPATIENT
Start: 2024-07-22

## 2024-07-22 RX ORDER — CELECOXIB 200 MG/1
400 CAPSULE ORAL ONCE
OUTPATIENT
Start: 2024-07-22 | End: 2024-07-22

## 2024-07-22 RX ORDER — OXYCODONE HYDROCHLORIDE 5 MG/1
10 TABLET ORAL
OUTPATIENT
Start: 2024-07-22

## 2024-07-22 RX ORDER — PROMETHAZINE HYDROCHLORIDE 25 MG/1
25 TABLET ORAL EVERY 6 HOURS PRN
Qty: 30 TABLET | Refills: 0 | Status: SHIPPED | OUTPATIENT
Start: 2024-07-22 | End: 2024-08-21

## 2024-07-22 RX ORDER — MUPIROCIN 20 MG/G
1 OINTMENT TOPICAL
OUTPATIENT
Start: 2024-07-22

## 2024-07-22 RX ORDER — METHOCARBAMOL 750 MG/1
750 TABLET, FILM COATED ORAL 3 TIMES DAILY
OUTPATIENT
Start: 2024-07-22

## 2024-07-22 RX ORDER — MUPIROCIN 20 MG/G
1 OINTMENT TOPICAL 2 TIMES DAILY
OUTPATIENT
Start: 2024-07-22 | End: 2024-07-27

## 2024-07-22 RX ORDER — PREGABALIN 75 MG/1
75 CAPSULE ORAL 2 TIMES DAILY
Qty: 60 CAPSULE | Refills: 1 | Status: SHIPPED | OUTPATIENT
Start: 2024-07-22 | End: 2024-09-20

## 2024-07-22 RX ORDER — ROPIVACAINE/EPI/CLONIDINE/KET 2.46-0.005
SYRINGE (ML) INJECTION ONCE
OUTPATIENT
Start: 2024-07-22 | End: 2024-07-22

## 2024-07-22 NOTE — HPI
This is a 49 y.o. male  who presents today for a preoperative evaluation in preparation for right hip replacement  Surgery is indicated for  arthritis   .   Patient is new to me.    The history has been obtained by speaking with the patient and reviewing the electronic medical record and/or outside health information. Significant health conditions for the perioperative period are discussed below in assessment and plan.     Patient reports current health status to be Excellent.  Denies any new symptoms before surgery.

## 2024-07-22 NOTE — PROGRESS NOTES
Eliel Coffman is a 49 y.o. year old here today for pre surgery optimization visit  in preparation for a Right total hip arthroplasty to be performed by Dr. Patino on 8/6/24.  he was last seen and treated in the clinic on 6/7/2024. he will be medically optimized by the pre op center. There has been no significant change in medical status since last visit. No fever, chills, malaise, or unexplained weight change.      Allergies, Medications, past medical and surgical history reviewed.    Focused examination performed.    Patient saw surgeon in clinic today. All questions answered. Patient encouraged to call with questions. Contact information given.     Pre, mary, and post operative procedures and expectations discussed. Goals of successful surgery reviewed and include manageable pain levels, surgical site free of infection, medication management, and ambulation with PT/OT assistance. Healthy weight management discussed with patient and caregiver who were receptive to eduction of healthy diet and activity. No other necessary lifestyle changes identified. Educated patient about signs and symptoms of infection, medication management, anticoagulation therapy, risk of tobacco and alcohol use, and self-care to promote healing. Surgical guide given for future reference. Hibiclens given to patient with instructions. All questions were answered.     Eliel Coffman verbalized an understanding to the education and goals. Patient has displayed readiness to engage in care and is ready to proceed with surgery.  Patient reports his girlfriend, Lianet, is able and ready to provide assistance at home after discharge.    Surgical and blood consents signed.    Eliel Coffman will contact us if there are any questions, concerns, or changes in medical status prior to surgery.       Joint class: scheduled on 7/24/24    Patient has discussed discharge planning with surgeon. Patient will be discharged to home following surgery.   patient  will be scheduled with [facility].  [facility]    50 minutes of time was spent on patient education, review of records, templating, H&P, , appointment scheduling and optimizing patient for surgery.

## 2024-07-22 NOTE — H&P (VIEW-ONLY)
Eliel Coffman  is here for a completion of his perioperative paperwork. he  Is scheduled to undergo Right Hip:     [id number] Replacement, hip, acetabular and proximal femoral prosthesis (Total Hip Arthroplasty), with or without autograft or allograft on 8/6/24.      Same day surgery, patient plans to go home today.    He  does need clearance for this procedure.    Pending clearance by pre-op center. Appointment today.    Risks, indications and benefits of the surgical procedure were discussed with the patient. All questions with regard to surgery, rehab, expected return to functional activities, activities of daily living and recreational endeavors were answered to his satisfaction.    Discussed COVID-19 with the patient, they are aware of our current policies and procedures, were given the option of delaying surgery, and they elect to proceed.    Patient was informed and understands the risks of surgery are greater for patients with a current condition or history of heart disease, obesity, clotting disorders, recurrent infections, steroid use, current or past smoking, and factors such as sedentary lifestyle and noncompliance with medications, therapy or follow-up. The degree of the increased risk is hard to estimate with any degree of precision.    Once no other questions were asked, a brief history and physical exam was then performed.    PAST MEDICAL HISTORY:   Past Medical History:   Diagnosis Date    Anxiety     Arthritis     Personal history of colonic polyps 04/06/2023    Sleep apnea     Testicular failure      PAST SURGICAL HISTORY:   Past Surgical History:   Procedure Laterality Date    COLONOSCOPY N/A 4/6/2023    Procedure: COLONOSCOPY;  Surgeon: Augusta Eubanks MD;  Location: [facility];  Service: Endoscopy;  Laterality: N/A;    INCISION AND DRAINAGE Left     hand    VASECTOMY       FAMILY HISTORY:   Family History   Problem Relation Name Age of Onset    Diabetes Mother      Hyperlipidemia Father       "Cancer Father      Prostate cancer Father       SOCIAL HISTORY:   Social History     Socioeconomic History    Marital status: Significant Other    Number of children: 4   Occupational History    Occupation:    Tobacco Use    Smoking status: Some Days     Types: Vaping with nicotine    Smokeless tobacco: Never    Tobacco comments:     VApes nicotine   Substance and Sexual Activity    Alcohol use: Not Currently     Comment: rarely    Drug use: No   Social History Narrative    [employer] Roman     Social Determinants of Health     Financial Resource Strain: Low Risk  (6/7/2024)    Overall Financial Resource Strain (CARDIA)     Difficulty of Paying Living Expenses: Not hard at all   Food Insecurity: No Food Insecurity (6/7/2024)    Hunger Vital Sign     Worried About Running Out of Food in the Last Year: Never true     Ran Out of Food in the Last Year: Never true   Physical Activity: Unknown (6/7/2024)    Exercise Vital Sign     Days of Exercise per Week: 0 days   Stress: No Stress Concern Present (6/7/2024)    [employer] - Occupational Stress Questionnaire     Feeling of Stress : Not at all   Housing Stability: Unknown (6/7/2024)    Housing Stability Vital Sign     Unable to Pay for Housing in the Last Year: No       MEDICATIONS:   Current Outpatient Medications:     BD LUER-ELIZABETH SYRINGE 3 mL 20 gauge x 1" Syrg, USE WITH TESTOSTERONE, Disp: , Rfl:     celecoxib (CELEBREX) 200 MG capsule, Take 1 capsule (200 mg total) by mouth 2 (two) times daily., Disp: 60 capsule, Rfl: 2    sertraline (ZOLOFT) 50 MG tablet, Take 50 mg by mouth., Disp: , Rfl:     testosterone cypionate (DEPOTESTOTERONE CYPIONATE) 200 mg/mL injection, Inject 200 mg into the muscle every 7 days., Disp: , Rfl:     vitamin D (VITAMIN D3) 1000 units Tab, Take 1,000 Units by mouth once daily., Disp: , Rfl:     ibuprofen (ADVIL,MOTRIN) 200 MG tablet, Take 800 mg by mouth every 6 (six) hours as needed for Pain. " (Patient not taking: Reported on 7/22/2024), Disp: , Rfl:   ALLERGIES: Review of patient's allergies indicates:  No Known Allergies    Review of Systems   Constitution: Negative. Negative for chills, fever and night sweats.   HENT: Negative for congestion and headaches.    Eyes: Negative for blurred vision, left vision loss and right vision loss.   Cardiovascular: Negative for chest pain and syncope.   Respiratory: Negative for cough and shortness of breath.    Endocrine: Negative for polydipsia, polyphagia and polyuria.   Hematologic/Lymphatic: Negative for bleeding problem. Does not bruise/bleed easily.   Skin: Negative for dry skin, itching and rash.   Musculoskeletal: Negative for falls and muscle weakness.   Gastrointestinal: Negative for abdominal pain and bowel incontinence.   Genitourinary: Negative for bladder incontinence and nocturia.   Neurological: Negative for disturbances in coordination, loss of balance and seizures.   Psychiatric/Behavioral: Negative for depression. The patient does not have insomnia.    Allergic/Immunologic: Negative for hives and persistent infections.     PHYSICAL EXAM:  GEN: A&Ox3, WD WN NAD  HEENT: WNL  CHEST: CTAB, no W/R/R  HEART: RRR, no M/R/G   ABD: Soft, NT ND, BS x4 QUADS  MS: Refer to previous note for detailed MS exam  NEURO: CN II-XII intact       The surgical consent was then reviewed with the patient, who agreed with all the contents of the consent form and it was signed. he was instructed to wait for a phone call from the anesthesia department prior to surgery to discuss past medical history, medications, and clearance. Also, informed he may be required to get additional testing per the anesthesia department prior to having surgery.     he was also instructed to present to Joint Boot Camp Class prior to surgery or his surgery may be cancelled or postponed.     Due to the serious nature of total joint infection and the high prevalence of community acquired MRSA,  vancomycin will be used perioperatively.     PHYSICAL THERAPY:  He was also instructed regarding physical therapy and will begin POD # 1-3. He is doing physical therapy at [facility]. [place].    POST OP CARE:instructions were reviewed including care of the wound and dressing after surgery and when he can shower.     PAIN MANAGEMENT: Eliel Coffman was also given a pain management regime, which includes the TENS unit which he declined, along with the education required for its use. He was also instructed regarding the ice wrap that will be in place after surgery and his postoperative pain medications.     PAIN MEDICATION:  Roxicodone 5mg 1-2 po q 4-6 hours prn pain  Phenergan 25 mg one p.o. q.4-6 hours p.r.n. nausea and vomiting.  Celebrex 200 mg BID  Robaxin 500mg TID PRN  Lyrica 75mg BID  Aspirin 325mg daily x 6 weeks for DVT prophylaxis starting on the evening after surgery.    Post op meds to be delivered bedside prior to discharge. Deliver to family if patient is in surgery at 5pm.     Patient was instructed not to take benzodiazepines with opioid and Lyrica during the perioperative period. Patient denies history of seizures.     Patient will also use bilateral TEDs on lower extremities, SCDs during surgery, and early ambulation post-op. If the patient was previously taking 81mg baby aspirin, they may have been told to continue taking it during the perioperative period.     Patient was also told to buy over the counter Prilosec medication and take it once daily for GI protection as long as they are taking NSAIDs or Aspirin.    The mobility limitation cannot be sufficiently resolved by the use of a cane. Patient's functional mobility deficit can be sufficiently resolved with the use of a (Rolling Walker or Walker). Patient's mobility limitation significantly impairs their ability to participate in one of more activities of daily living. The use of a (Rolling Walker or Walker)  will significantly improve the patient's ability to participate in MRADLS and the patient will use it on regular basis in the home.     DVT prophylaxis was discussed with the patient today including risk factors for developing DVTs and history of DVTs. The patient was asked if any specific recommendations were given from the doctor/s that did pre-operative surgical clearance. I may have prescribed a mechanical DVT prophylaxis device, , for the above listed patient, to reduce the risk for clot formation and complications that can arise from a DVT. In my professional medical opinion, I consider this medically necessary and have prescribed the device for the purpose of postoperative treatment and rehabilitation. This can treat both the acute and chronic aspects of the inflammatory reaction that accompanies trauma and surgery. Additionally, I am prescribing mechanical DVT prophylaxis because the patient will have restricted mobility post-operatively and is at high risk for DVT. Failure to approve this device will compromise the outcome of this patient's healing process.     The patient was told that narcotic pain medications may make them drowsy and instructions were given to not sign legal documents, drive or operate heavy machinery, cars, or equipment while under the influence of narcotic medications.     Dr. Patino was present in clinic and directly involved in the additional informed consent process with signing of paperwork and pre-op evaluation. The patient had the opportunity to ask Dr. Patino any additional questions and all questions were answered.    As there were no other questions to be asked, he was given my business card along with Dr. Patino business card if he has any questions or concerns prior to surgery or in the postop period.

## 2024-07-22 NOTE — H&P
Eliel Coffman  is here for a completion of his perioperative paperwork. he  Is scheduled to undergo Right Hip:     31725 Replacement, hip, acetabular and proximal femoral prosthesis (Total Hip Arthroplasty), with or without autograft or allograft on 8/6/24.      Same day surgery, patient plans to go home today.    He  does need clearance for this procedure.    Pending clearance by pre-op center. Appointment today.    Risks, indications and benefits of the surgical procedure were discussed with the patient. All questions with regard to surgery, rehab, expected return to functional activities, activities of daily living and recreational endeavors were answered to his satisfaction.    Discussed COVID-19 with the patient, they are aware of our current policies and procedures, were given the option of delaying surgery, and they elect to proceed.    Patient was informed and understands the risks of surgery are greater for patients with a current condition or history of heart disease, obesity, clotting disorders, recurrent infections, steroid use, current or past smoking, and factors such as sedentary lifestyle and noncompliance with medications, therapy or follow-up. The degree of the increased risk is hard to estimate with any degree of precision.    Once no other questions were asked, a brief history and physical exam was then performed.    PAST MEDICAL HISTORY:   Past Medical History:   Diagnosis Date    Anxiety     Arthritis     Personal history of colonic polyps 04/06/2023    Sleep apnea     Testicular failure      PAST SURGICAL HISTORY:   Past Surgical History:   Procedure Laterality Date    COLONOSCOPY N/A 4/6/2023    Procedure: COLONOSCOPY;  Surgeon: Augusta Eubanks MD;  Location: [facility];  Service: Endoscopy;  Laterality: N/A;    INCISION AND DRAINAGE Left     hand    VASECTOMY       FAMILY HISTORY:   Family History   Problem Relation Name Age of Onset    Diabetes Mother      Hyperlipidemia Father       "Cancer Father      Prostate cancer Father       SOCIAL HISTORY:   Social History     Socioeconomic History    Marital status: Significant Other    Number of children: 4   Occupational History    Occupation:    Tobacco Use    Smoking status: Some Days     Types: Vaping with nicotine    Smokeless tobacco: Never    Tobacco comments:     VApes nicotine   Substance and Sexual Activity    Alcohol use: Not Currently     Comment: rarely    Drug use: No   Social History Narrative    Wicho Roman     Social Determinants of Health     Financial Resource Strain: Low Risk  (6/7/2024)    Overall Financial Resource Strain (CARDIA)     Difficulty of Paying Living Expenses: Not hard at all   Food Insecurity: No Food Insecurity (6/7/2024)    Hunger Vital Sign     Worried About Running Out of Food in the Last Year: Never true     Ran Out of Food in the Last Year: Never true   Physical Activity: Unknown (6/7/2024)    Exercise Vital Sign     Days of Exercise per Week: 0 days   Stress: No Stress Concern Present (6/7/2024)    [employer] [place] [employer] - Occupational Stress Questionnaire     Feeling of Stress : Not at all   Housing Stability: Unknown (6/7/2024)    Housing Stability Vital Sign     Unable to Pay for Housing in the Last Year: No       MEDICATIONS:   Current Outpatient Medications:     BD LUER-ELIZABETH SYRINGE 3 mL 20 gauge x 1" Syrg, USE WITH TESTOSTERONE, Disp: , Rfl:     celecoxib (CELEBREX) 200 MG capsule, Take 1 capsule (200 mg total) by mouth 2 (two) times daily., Disp: 60 capsule, Rfl: 2    sertraline (ZOLOFT) 50 MG tablet, Take 50 mg by mouth., Disp: , Rfl:     testosterone cypionate (DEPOTESTOTERONE CYPIONATE) 200 mg/mL injection, Inject 200 mg into the muscle every 7 days., Disp: , Rfl:     vitamin D (VITAMIN D3) 1000 units Tab, Take 1,000 Units by mouth once daily., Disp: , Rfl:     ibuprofen (ADVIL,MOTRIN) 200 MG tablet, Take 800 mg by mouth every 6 (six) hours as needed for Pain. " (Patient not taking: Reported on 7/22/2024), Disp: , Rfl:   ALLERGIES: Review of patient's allergies indicates:  No Known Allergies    Review of Systems   Constitution: Negative. Negative for chills, fever and night sweats.   HENT: Negative for congestion and headaches.    Eyes: Negative for blurred vision, left vision loss and right vision loss.   Cardiovascular: Negative for chest pain and syncope.   Respiratory: Negative for cough and shortness of breath.    Endocrine: Negative for polydipsia, polyphagia and polyuria.   Hematologic/Lymphatic: Negative for bleeding problem. Does not bruise/bleed easily.   Skin: Negative for dry skin, itching and rash.   Musculoskeletal: Negative for falls and muscle weakness.   Gastrointestinal: Negative for abdominal pain and bowel incontinence.   Genitourinary: Negative for bladder incontinence and nocturia.   Neurological: Negative for disturbances in coordination, loss of balance and seizures.   Psychiatric/Behavioral: Negative for depression. The patient does not have insomnia.    Allergic/Immunologic: Negative for hives and persistent infections.     PHYSICAL EXAM:  GEN: A&Ox3, WD WN NAD  HEENT: WNL  CHEST: CTAB, no W/R/R  HEART: RRR, no M/R/G   ABD: Soft, NT ND, BS x4 QUADS  MS: Refer to previous note for detailed MS exam  NEURO: CN II-XII intact       The surgical consent was then reviewed with the patient, who agreed with all the contents of the consent form and it was signed. he was instructed to wait for a phone call from the anesthesia department prior to surgery to discuss past medical history, medications, and clearance. Also, informed he may be required to get additional testing per the anesthesia department prior to having surgery.     he was also instructed to present to Joint Boot Camp Class prior to surgery or his surgery may be cancelled or postponed.     Due to the serious nature of total joint infection and the high prevalence of community acquired MRSA,  vancomycin will be used perioperatively.     PHYSICAL THERAPY:  He was also instructed regarding physical therapy and will begin POD # 1-3. He is doing physical therapy at [facility]. [place].    POST OP CARE:instructions were reviewed including care of the wound and dressing after surgery and when he can shower.     PAIN MANAGEMENT: Eliel Coffman was also given a pain management regime, which includes the TENS unit which he declined, along with the education required for its use. He was also instructed regarding the ice wrap that will be in place after surgery and his postoperative pain medications.     PAIN MEDICATION:  Roxicodone 5mg 1-2 po q 4-6 hours prn pain  Phenergan 25 mg one p.o. q.4-6 hours p.r.n. nausea and vomiting.  Celebrex 200 mg BID  Robaxin 500mg TID PRN  Lyrica 75mg BID  Aspirin 325mg daily x 6 weeks for DVT prophylaxis starting on the evening after surgery.    Post op meds to be delivered bedside prior to discharge. Deliver to family if patient is in surgery at 5pm.     Patient was instructed not to take benzodiazepines with opioid and Lyrica during the perioperative period. Patient denies history of seizures.     Patient will also use bilateral TEDs on lower extremities, SCDs during surgery, and early ambulation post-op. If the patient was previously taking 81mg baby aspirin, they may have been told to continue taking it during the perioperative period.     Patient was also told to buy over the counter Prilosec medication and take it once daily for GI protection as long as they are taking NSAIDs or Aspirin.    The mobility limitation cannot be sufficiently resolved by the use of a cane. Patient's functional mobility deficit can be sufficiently resolved with the use of a (Rolling Walker or Walker). Patient's mobility limitation significantly impairs their ability to participate in one of more activities of daily living. The use of a (Rolling Walker or Walker)  will significantly improve the patient's ability to participate in MRADLS and the patient will use it on regular basis in the home.     DVT prophylaxis was discussed with the patient today including risk factors for developing DVTs and history of DVTs. The patient was asked if any specific recommendations were given from the doctor/s that did pre-operative surgical clearance. I may have prescribed a mechanical DVT prophylaxis device, , for the above listed patient, to reduce the risk for clot formation and complications that can arise from a DVT. In my professional medical opinion, I consider this medically necessary and have prescribed the device for the purpose of postoperative treatment and rehabilitation. This can treat both the acute and chronic aspects of the inflammatory reaction that accompanies trauma and surgery. Additionally, I am prescribing mechanical DVT prophylaxis because the patient will have restricted mobility post-operatively and is at high risk for DVT. Failure to approve this device will compromise the outcome of this patient's healing process.     The patient was told that narcotic pain medications may make them drowsy and instructions were given to not sign legal documents, drive or operate heavy machinery, cars, or equipment while under the influence of narcotic medications.     Dr. Patino was present in clinic and directly involved in the additional informed consent process with signing of paperwork and pre-op evaluation. The patient had the opportunity to ask Dr. Patino any additional questions and all questions were answered.    As there were no other questions to be asked, he was given my business card along with Dr. Patino business card if he has any questions or concerns prior to surgery or in the postop period.

## 2024-07-22 NOTE — ASSESSMENT & PLAN NOTE
12/14/22  NUCLEAR STRESS TEST (CUPID ONLY)  Impression  1.   No scintigraphic evidence of myocardial infarction or ischemia.   Electronically Signed By: Dre Fabian MD 12/14/2022 10:35 AM CST  Narrative    [facility] CV NUCLEAR STRESS TEST: 12/14/2022 7:40 AM CST     CLINICAL HISTORY: 47 years of age, Male, R07.9   Acute chest pain.     COMPARISON: None.     PROCEDURE:    Radiopharmaceutical: Tc-99m Myoview 7.7 mCi IV for the stress study and 31.8 mCi IV for the rest study.     Technique:     Following intravenous administration of  Technetium 99-m Myoview, gated cardiac tomographic images were acquired.  The patient was stressed utilizing a Taco protocol, and exercised for 7 minutes 30 seconds. Exercise was stopped due to  shortness of breath.  Patient achieved 99% of maximum predicted heart rate and 6.6 METs.  At maximal stress patient received an intravenous dose of Technetium 99-m Myoview, and gated cardiac tomographic images were acquired.  The images are reformatted in short axis, horizontal and vertical long axis views. Prone and supine stress images were obtained.       FINDINGS:      There is normal radiopharmaceutical distribution to the left ventricular myocardium on both the stress and rest portions of the study. There are no perfusion defects to suggest myocardial infarction or stress-induced ischemia.     The gated study was analyzed for function and wall motion. The left ventricle demonstrates normal wall motion, with an estimated ejection fraction of 60% at rest and 61% post-stress. The left ventricular chamber demonstrates an estimated end diastolic volume of 68 mL at rest and 68 mL post-stress.     TID ratio: 0.99  Exam End: 12/14/22 09:32    Specimen Collected: 12/14/22 10:29 Last Resulted: 12/14/22 10:35   Received From: [facility]

## 2024-07-22 NOTE — DISCHARGE INSTRUCTIONS
Your surgery has been scheduled for:________8/6/24__________________________________    You should report to:  ____[facility], located on the [place] side of the first floor of the           [facility] ([phone])  ____[facility], located on the [facility] side of the            Second floor of the [facility] ([phone])  ____3rd Floor [facility] located on the [facility] side of the [facility] [phone]  __X__[facility]: located at [address] [place] [address]. [address].     Please Note   Tell your doctor if you take Aspirin, products containing Aspirin, herbal medications  or blood thinners, such as Coumadin, Ticlid, or Plavix.  (Consult your provider regarding holding or stopping before surgery).  Arrange for someone to drive you home following surgery.  You will not be allowed to leave the surgical facility alone or drive yourself home following sedation and anesthesia.    Before Surgery  Stop taking all herbal medications, vitamins, and supplements 7 days prior to surgery  No Motrin/Advil (Ibuprofen) 7 days before surgery  No Aleve (Naproxen) 7 days before surgery   No Goody's/BC Powder 7 days before surgery  Refrain from drinking alcoholic beverages for 24 hours before and after surgery  Stop or limit smoking at least 24 hours prior to surgery  You may take Tylenol for pain    Night before Surgery  Do not eat or drink after midnight  Take a shower or bath (shower is recommended).  Bathe with Hibiclens soap or an antibacterial soap from the neck down.  If not supplied by your surgeon, hibiclens soap will need to be purchased over the counter in pharmacy.  Rinse soap off thoroughly.  Shampoo your hair with your regular shampoo    The Day of Surgery  Take another bath or shower with hibiclens or any antibacterial soap, to reduce the chance of infection.  Take heart and  blood pressure medications with a small sip of water, as advised by the perioperative team.  Do not take fluid pills  You may brush your teeth and rinse your mouth, but do not swallow any additional water.   Do not apply perfumes, powder, body lotions or deodorant on the day of surgery.  Nail polish should be removed.  Do not wear makeup or moisturizer  Wear comfortable clothes, such as a button front shirt and loose fitting pants.  Leave all jewelry, including body piercings, and valuables at home.    Bring any devices you will need after surgery such as crutches or canes.  If you have sleep apnea, please bring your CPAP machine  In the event that your physical condition changes including the onset of a cold or respiratory illness, or if you have to delay or cancel your surgery, please notify your surgeon.

## 2024-07-22 NOTE — ASSESSMENT & PLAN NOTE
This client has a possible diagnosis of obstructive sleep apnea (JING)  CPAP used  Instructions were given to avoid the following: sleeping supine, weight gain ,alcoholic beverages , sedative medications, and CNS depressant use as these can all worsen JING.    Untreated sleep apnea has been shown to increase daytime sleepiness, hypertension, heart disease and stroke which were discussed with the patient at this time      Please use caution with medications that induce respiratory depression in the perioperative period

## 2024-07-22 NOTE — PROGRESS NOTES
[facility] [address]  Progress Note    Patient Name: Eliel Coffman  MRN: [id number]  Date of Evaluation- 07/23/2024  PCP- Cuauhtemoc Carey MD    Future cases for Eliel Coffman [[id number]]       Case ID Status Date Time Ruddy Procedure Provider Location    [id number] [facility] 8/6/2024  7:00  ARTHROPLASTY, HIP Merlyn Patino MD [[id number]] [facility] OR            HPI:  This is a 49 y.o. male  who presents today for a preoperative evaluation in preparation for right hip replacement  Surgery is indicated for  arthritis   .   Patient is new to me.    The history has been obtained by speaking with the patient and reviewing the electronic medical record and/or outside health information. Significant health conditions for the perioperative period are discussed below in assessment and plan.     Patient reports current health status to be Excellent.  Denies any new symptoms before surgery.       Subjective/ Objective:     Chief Complaint: Preoperative evaulation, perioperative medical management, and complication reduction plan.     Functional Capacity:  Able to climb a flight of stairs without CP SOB or Syncope.  Able to meet 4 METs      Anesthesia issues: None    Difficulty mouth opening:N    Steroid use in the last 12 months:    NONE except testosterone    Dental Issues:N    Family anesthesia difficulty: None     Family Hx of Thrombosis N    Past Medical History:   Diagnosis Date    Anxiety     Arthritis     Personal history of colonic polyps 04/06/2023    Sleep apnea     Testicular failure      Past Surgical History:   Procedure Laterality Date    COLONOSCOPY N/A 4/6/2023    Procedure: COLONOSCOPY;  Surgeon: Augusta Eubanks MD;  Location: [facility];  Service: Endoscopy;  Laterality: N/A;    INCISION AND DRAINAGE Left     hand    VASECTOMY         Review of Systems   Constitutional:  Negative for chills, fatigue, fever and unexpected weight change.   HENT:  Negative for trouble swallowing and voice change.    Eyes:   "Negative for photophobia and visual disturbance.        No acute visual changes   Respiratory:  Negative for cough, shortness of breath and wheezing.         Cpap - WEARS NIGHTLY   Cardiovascular:  Negative for chest pain, palpitations and leg swelling.   Gastrointestinal:  Negative for abdominal pain, blood in stool, constipation, diarrhea, nausea and vomiting.        No FLD, Hepatitis, Cirrhosis  No BRB or black tarry stool   Genitourinary:  Negative for difficulty urinating, dysuria, frequency, hematuria and urgency.        Nocturia 0   Musculoskeletal:  Positive for arthralgias and gait problem. Negative for myalgias, neck pain and neck stiffness.   Neurological:  Negative for dizziness, tremors, seizures, syncope, weakness, light-headedness, numbness and headaches.   Psychiatric/Behavioral:  Negative for agitation, behavioral problems, confusion, decreased concentration, dysphoric mood, hallucinations, self-injury, sleep disturbance and suicidal ideas. The patient is not nervous/anxious and is not hyperactive.         VITALS  Visit Vitals  /80 (BP Location: Left arm, Patient Position: Sitting)   Pulse 69   Temp 97.8 °F (36.6 °C) (Oral)   Ht 5' 11" (1.803 m)   Wt 113.4 kg (250 lb)   SpO2 95%   BMI 34.87 kg/m²          Physical Exam  Constitutional:       General: He is not in acute distress.     Appearance: He is well-developed. He is not diaphoretic.   HENT:      Head: Normocephalic.      Right Ear: Hearing normal.      Left Ear: Hearing normal.      Nose: Nose normal.      Mouth/Throat:      Lips: Pink.      Mouth: Mucous membranes are moist.   Eyes:      General: Lids are normal.      Conjunctiva/sclera: Conjunctivae normal.      Pupils: Pupils are equal, round, and reactive to light.   Neck:      Vascular: No carotid bruit.      Trachea: Trachea and phonation normal.   Cardiovascular:      Rate and Rhythm: Normal rate and regular rhythm.      Pulses:           Carotid pulses are 2+ on the right side " and 2+ on the left side.       Radial pulses are 2+ on the right side and 2+ on the left side.        Posterior tibial pulses are 2+ on the right side and 2+ on the left side.      Heart sounds: Normal heart sounds. No murmur heard.     No friction rub. No gallop.   Pulmonary:      Effort: Pulmonary effort is normal.      Breath sounds: Normal breath sounds.      Comments: Clear and equal  Anterior and Posterior BBS  Abdominal:      General: Abdomen is protuberant. Bowel sounds are normal. There is no distension.      Palpations: Abdomen is soft.      Tenderness: There is no abdominal tenderness.   Musculoskeletal:         General: No tenderness or deformity. Normal range of motion.      Cervical back: Normal range of motion.      Right lower leg: No edema.      Left lower leg: No edema.   Lymphadenopathy:      Head:      Right side of head: No submental, submandibular, tonsillar, preauricular, posterior auricular or occipital adenopathy.      Left side of head: No submental, submandibular, tonsillar, preauricular, posterior auricular or occipital adenopathy.      Cervical:      Right cervical: No superficial cervical adenopathy.     Left cervical: No superficial cervical adenopathy.   Skin:     General: Skin is warm and dry.      Capillary Refill: Capillary refill takes 2 to 3 seconds.      Coloration: Skin is not pale.      Findings: No erythema or rash.   Neurological:      Mental Status: He is alert and oriented to person, place, and time.      GCS: GCS eye subscore is 4. GCS verbal subscore is 5. GCS motor subscore is 6.      Motor: No abnormal muscle tone.      Coordination: Coordination normal.   Psychiatric:         Attention and Perception: Attention and perception normal.         Mood and Affect: Mood and affect normal.         Speech: Speech normal.         Behavior: Behavior normal. Behavior is cooperative.         Thought Content: Thought content normal.         Cognition and Memory: Cognition and  memory normal.         Judgment: Judgment normal.          Significant Labs:  Lab Results   Component Value Date    WBC 8.21 07/22/2024    HGB 15.7 07/22/2024    HCT 52.6 07/22/2024     07/22/2024    ALT 35 07/22/2024    AST 42 (H) 07/22/2024     07/22/2024    K 4.1 07/22/2024     07/22/2024    CREATININE 0.9 07/22/2024    BUN 12 07/22/2024    CO2 28 07/22/2024    TSH 1.82 12/13/2022    INR 1.0 07/22/2024       Diagnostic Studies: No relevant studies.    EKG:   Results for orders placed or performed during the hospital encounter of 07/22/24   EKG 12-lead    Collection Time: 07/22/24  3:24 PM   Result Value Ref Range    QRS Duration 106 ms    OHS QTC Calculation 403 ms    Narrative    Test Reason : Z01.818,    Vent. Rate : 080 BPM     Atrial Rate : 080 BPM     P-R Int : 154 ms          QRS Dur : 106 ms      QT Int : 350 ms       P-R-T Axes : 054 040 018 degrees     QTc Int : 403 ms    Normal sinus rhythm  Nonspecific T wave abnormality    Abnormal ECG  No previous ECGs available  Confirmed by Micheal Milian MD ([id number]) on 7/22/2024 5:51:25 PM    Referred By: NINO SANCHEZ           Confirmed By:Micheal Milian MD       2D ECHO:  TTE:  No results found for this or any previous visit.    WILIAN:  No results found for this or any previous visit.     Imaging     Active Cardiac Conditions: None      Revised Cardiac Risk Index   High -Risk Surgery  Intraperitoneal; Intrathoracic; suprainguinal vascular Yes- + 1 No- 0   History of Ischemic Heart Disease   (Hx of MI/positive exercise test/current chest pain due to ischemia/use of nitrate therapy/EKG with pathological Q waves) Yes- + 1 No- 0   History of CHF  (Pulmonary edema/bilateral rales or S3 gallop/PND/CXR showing pulmonary vascular redistribution) Yes- + 1 No- 0   History of CVA   (Prior stroke or TIA) Yes- + 1 No- 0   Pre-operative treatment with insulin Yes- + 1 No- 0   Pre-operative creatinine > 2mg/dl Yes- + 1 No- 0   Total:      Risk Status:  Estimated risk of  cardiac complications after non-cardiac surgery using the Revised Cardiac Risk Index for Preoperative risk is 3.9 %      ARISCAT (Canet) risk index: Low: 1.6% risk of post-op pulmonary complications.    American Society of Anesthesiologists Physical Status classification (ASA): 2           No further cardiac workup needed prior to surgery.        Preoperative cardiac risk assessment-  The patient does not have any active cardiac conditions . Revised cardiac risk index predictors- ---.Functional capacity is more than 4 Mets. He will be undergoing a Orthopedic procedure that carries a Moderate Risk risk     The estimated risk of the rate of adverse cardiac outcomes  3.9    No further cardiac work up is indicated prior to proceeding with the surgery          American Society of Anesthesiologists Physical status classification ( ASA ) class: 2     Postoperative pulmonary complication risk assessment: 1.6     ARISCAT ( Canet) risk index- risk class -  Low, if duration of surgery is under 3 hours, intermediate, if duration of surgery is over 3 hours     Assessment/Plan:     Male hypogonadism  TESTOSTERONE- HOLD 1 WEEK BEFORE SURGERY    Decreased range of motion of hip  SX scheduled 8/6/24    JING (obstructive sleep apnea)  This client has a possible diagnosis of obstructive sleep apnea (JING)  CPAP used  Instructions were given to avoid the following: sleeping supine, weight gain ,alcoholic beverages , sedative medications, and CNS depressant use as these can all worsen JING.    Untreated sleep apnea has been shown to increase daytime sleepiness, hypertension, heart disease and stroke which were discussed with the patient at this time      Please use caution with medications that induce respiratory depression in the perioperative period    History of nuclear stress test   12/14/22  NUCLEAR STRESS TEST (CUPID ONLY)  Impression  1.   No scintigraphic evidence of myocardial infarction or ischemia.   Electronically Signed By:  Dre Fabian MD 12/14/2022 10:35 AM CST  Narrative    [facility] [place]UCLEAR STRESS TEST: 12/14/2022 7:40 AM CST     CLINICAL HISTORY: 47 years of age, Male, R07.9   Acute chest pain.     COMPARISON: None.     PROCEDURE:    Radiopharmaceutical: Tc-99m Myoview 7.7 mCi IV for the stress study and 31.8 mCi IV for the rest study.     Technique:     Following intravenous administration of  Technetium 99-m Myoview, gated cardiac tomographic images were acquired.  The patient was stressed utilizing a Taco protocol, and exercised for 7 minutes 30 seconds. Exercise was stopped due to  shortness of breath.  Patient achieved 99% of maximum predicted heart rate and 6.6 METs.  At maximal stress patient received an intravenous dose of Technetium 99-m Myoview, and gated cardiac tomographic images were acquired.  The images are reformatted in short axis, horizontal and vertical long axis views. Prone and supine stress images were obtained.       FINDINGS:      There is normal radiopharmaceutical distribution to the left ventricular myocardium on both the stress and rest portions of the study. There are no perfusion defects to suggest myocardial infarction or stress-induced ischemia.     The gated study was analyzed for function and wall motion. The left ventricle demonstrates normal wall motion, with an estimated ejection fraction of 60% at rest and 61% post-stress. The left ventricular chamber demonstrates an estimated end diastolic volume of 68 mL at rest and 68 mL post-stress.     TID ratio: 0.99  Exam End: 12/14/22 09:32    Specimen Collected: 12/14/22 10:29 Last Resulted: 12/14/22 10:35   Received From: [facility]        Elevated AST (SGOT)  AST 42- no evidence of hepatic decompensation  F/U with PCP          Preventive perioperative care    Thromboembolic prophylaxis:  His risk factors for thrombosis include surgical procedure, age, and reduced mobility.I suggest  thromboembolic prophylaxis ( mechanical/pharmacological,  weighing the risk benefits of pharmacological agent use considering mary procedural bleeding )  during the perioperative period.I suggested being active in the post operative period.      Postoperative pulmonary complication prophylaxis- I suggest incentive spirometry use and early ambulation  Brush teeth twice per day, oral rinses, sleep with the head of the bed up 30 degrees     Renal complication prophylaxis-Risk factors for renal complications include age . I suggest keeping him well hydrated and avoidance/ minimizing the use of  NSAID's,MASTERS 2 Inhibitors ,IV contrast if possible in the perioperative period.I suggested drinking 2 litre's of water a day      Surgical site Infection Prophylaxis-I  suggest appropriate antibiotic for Prophylaxis against Surgical site infections Shower with Hibiclens in the night before surgery and the morning of surgery    In view of Spine procedure the patient  is at risk of postoperative urinary retention.  I suggest avoidance / minimizing the of  Benzodiazepines,Anticholinergic medication,antihistamines ( Benadryl) , if possible in the perioperative period. I suggest using the minimum possible use of opioids for the minimum period of time in the perioperative period. Benadryl avoidance suggested      This visit was focused on Preoperative evaluation, Perioperative Medical management, complication reduction plans. I suggest that the patient follows up with primary care or relevant sub specialists for ongoing health care.    I appreciate the opportunity to be involved in this patients care. Please feel free to contact me if there were any questions about this consultation.    Patient is optimized    I spent a total of 30 minutes on the day of the visit.  This includes face to face time and non-face to face time preparing to see the patient (eg, review of tests), obtaining and/or reviewing separately obtained history, documenting clinical information in the electronic or other  health record, independently interpreting results and communicating results to the patient/family/caregiver, or care coordinator.      Hoang Chávez NP  Perioperative Medicine  [facility]   Pager [phone]

## 2024-07-22 NOTE — OUTPATIENT SUBJECTIVE & OBJECTIVE
Outpatient Subjective & Objective      Chief Complaint: Preoperative evaulation, perioperative medical management, and complication reduction plan.     Functional Capacity:  Able to climb a flight of stairs without CP SOB or Syncope.  Able to meet 4 METs      Anesthesia issues: None    Difficulty mouth opening:N    Steroid use in the last 12 months:    NONE except testosterone    Dental Issues:N    Family anesthesia difficulty: None     Family Hx of Thrombosis N    Past Medical History:   Diagnosis Date    Anxiety     Arthritis     Personal history of colonic polyps 04/06/2023    Sleep apnea     Testicular failure      Past Surgical History:   Procedure Laterality Date    COLONOSCOPY N/A 4/6/2023    Procedure: COLONOSCOPY;  Surgeon: Augusta Eubanks MD;  Location: [facility];  Service: Endoscopy;  Laterality: N/A;    INCISION AND DRAINAGE Left     hand    VASECTOMY         Review of Systems   Constitutional:  Negative for chills, fatigue, fever and unexpected weight change.   HENT:  Negative for trouble swallowing and voice change.    Eyes:  Negative for photophobia and visual disturbance.        No acute visual changes   Respiratory:  Negative for cough, shortness of breath and wheezing.         Cpap - WEARS NIGHTLY   Cardiovascular:  Negative for chest pain, palpitations and leg swelling.   Gastrointestinal:  Negative for abdominal pain, blood in stool, constipation, diarrhea, nausea and vomiting.        No FLD, Hepatitis, Cirrhosis  No BRB or black tarry stool   Genitourinary:  Negative for difficulty urinating, dysuria, frequency, hematuria and urgency.        Nocturia 0   Musculoskeletal:  Positive for arthralgias and gait problem. Negative for myalgias, neck pain and neck stiffness.   Neurological:  Negative for dizziness, tremors, seizures, syncope, weakness, light-headedness, numbness and headaches.   Psychiatric/Behavioral:  Negative for agitation, behavioral problems, confusion, decreased concentration,  "dysphoric mood, hallucinations, self-injury, sleep disturbance and suicidal ideas. The patient is not nervous/anxious and is not hyperactive.         VITALS  Visit Vitals  /80 (BP Location: Left arm, Patient Position: Sitting)   Pulse 69   Temp 97.8 °F (36.6 °C) (Oral)   Ht 5' 11" (1.803 m)   Wt 113.4 kg (250 lb)   SpO2 95%   BMI 34.87 kg/m²          Physical Exam  Constitutional:       General: He is not in acute distress.     Appearance: He is well-developed. He is not diaphoretic.   HENT:      Head: Normocephalic.      Right Ear: Hearing normal.      Left Ear: Hearing normal.      Nose: Nose normal.      Mouth/Throat:      Lips: Pink.      Mouth: Mucous membranes are moist.   Eyes:      General: Lids are normal.      Conjunctiva/sclera: Conjunctivae normal.      Pupils: Pupils are equal, round, and reactive to light.   Neck:      Vascular: No carotid bruit.      Trachea: Trachea and phonation normal.   Cardiovascular:      Rate and Rhythm: Normal rate and regular rhythm.      Pulses:           Carotid pulses are 2+ on the right side and 2+ on the left side.       Radial pulses are 2+ on the right side and 2+ on the left side.        Posterior tibial pulses are 2+ on the right side and 2+ on the left side.      Heart sounds: Normal heart sounds. No murmur heard.     No friction rub. No gallop.   Pulmonary:      Effort: Pulmonary effort is normal.      Breath sounds: Normal breath sounds.      Comments: Clear and equal  Anterior and Posterior BBS  Abdominal:      General: Abdomen is protuberant. Bowel sounds are normal. There is no distension.      Palpations: Abdomen is soft.      Tenderness: There is no abdominal tenderness.   Musculoskeletal:         General: No tenderness or deformity. Normal range of motion.      Cervical back: Normal range of motion.      Right lower leg: No edema.      Left lower leg: No edema.   Lymphadenopathy:      Head:      Right side of head: No submental, submandibular, " tonsillar, preauricular, posterior auricular or occipital adenopathy.      Left side of head: No submental, submandibular, tonsillar, preauricular, posterior auricular or occipital adenopathy.      Cervical:      Right cervical: No superficial cervical adenopathy.     Left cervical: No superficial cervical adenopathy.   Skin:     General: Skin is warm and dry.      Capillary Refill: Capillary refill takes 2 to 3 seconds.      Coloration: Skin is not pale.      Findings: No erythema or rash.   Neurological:      Mental Status: He is alert and oriented to person, place, and time.      GCS: GCS eye subscore is 4. GCS verbal subscore is 5. GCS motor subscore is 6.      Motor: No abnormal muscle tone.      Coordination: Coordination normal.   Psychiatric:         Attention and Perception: Attention and perception normal.         Mood and Affect: Mood and affect normal.         Speech: Speech normal.         Behavior: Behavior normal. Behavior is cooperative.         Thought Content: Thought content normal.         Cognition and Memory: Cognition and memory normal.         Judgment: Judgment normal.          Significant Labs:  Lab Results   Component Value Date    WBC 8.21 07/22/2024    HGB 15.7 07/22/2024    HCT 52.6 07/22/2024     07/22/2024    ALT 35 07/22/2024    AST 42 (H) 07/22/2024     07/22/2024    K 4.1 07/22/2024     07/22/2024    CREATININE 0.9 07/22/2024    BUN 12 07/22/2024    CO2 28 07/22/2024    TSH 1.82 12/13/2022    INR 1.0 07/22/2024       Diagnostic Studies: No relevant studies.    EKG:   Results for orders placed or performed during the hospital encounter of 07/22/24   EKG 12-lead    Collection Time: 07/22/24  3:24 PM   Result Value Ref Range    QRS Duration 106 ms    OHS QTC Calculation 403 ms    Narrative    Test Reason : Z01.818,    Vent. Rate : 080 BPM     Atrial Rate : 080 BPM     P-R Int : 154 ms          QRS Dur : 106 ms      QT Int : 350 ms       P-R-T Axes : 769 168 128  degrees     QTc Int : 403 ms    Normal sinus rhythm  Nonspecific T wave abnormality    Abnormal ECG  No previous ECGs available  Confirmed by Micheal Milian MD ([id number]) on 7/22/2024 5:51:25 PM    Referred By: NINO SANCHEZ           Confirmed By:Micheal Milian MD       2D ECHO:  TTE:  No results found for this or any previous visit.    WILIAN:  No results found for this or any previous visit.     Imaging     Active Cardiac Conditions: None      Revised Cardiac Risk Index   High -Risk Surgery  Intraperitoneal; Intrathoracic; suprainguinal vascular Yes- + 1 No- 0   History of Ischemic Heart Disease   (Hx of MI/positive exercise test/current chest pain due to ischemia/use of nitrate therapy/EKG with pathological Q waves) Yes- + 1 No- 0   History of CHF  (Pulmonary edema/bilateral rales or S3 gallop/PND/CXR showing pulmonary vascular redistribution) Yes- + 1 No- 0   History of CVA   (Prior stroke or TIA) Yes- + 1 No- 0   Pre-operative treatment with insulin Yes- + 1 No- 0   Pre-operative creatinine > 2mg/dl Yes- + 1 No- 0   Total:      Risk Status:  Estimated risk of cardiac complications after non-cardiac surgery using the Revised Cardiac Risk Index for Preoperative risk is 3.9 %      ARISCAT (Canet) risk index: Low: 1.6% risk of post-op pulmonary complications.    American Society of Anesthesiologists Physical Status classification (ASA): 2           No further cardiac workup needed prior to surgery.    Outpatient Subjective & Objective

## 2024-07-24 ENCOUNTER — CLINICAL SUPPORT (OUTPATIENT)
Dept: ORTHOPEDICS | Facility: CLINIC | Age: 49
End: 2024-07-24
Payer: COMMERCIAL

## 2024-07-24 DIAGNOSIS — M25.651 DECREASED RANGE OF RIGHT HIP MOVEMENT: Primary | ICD-10-CM

## 2024-07-24 PROCEDURE — 99499 UNLISTED E&M SERVICE: CPT | Mod: S$GLB,,, | Performed by: ORTHOPAEDIC SURGERY

## 2024-08-05 ENCOUNTER — ANESTHESIA EVENT (OUTPATIENT)
Dept: SURGERY | Facility: HOSPITAL | Age: 49
End: 2024-08-05
Payer: COMMERCIAL

## 2024-08-05 ENCOUNTER — TELEPHONE (OUTPATIENT)
Dept: SPORTS MEDICINE | Facility: CLINIC | Age: 49
End: 2024-08-05
Payer: COMMERCIAL

## 2024-08-06 ENCOUNTER — ANESTHESIA (OUTPATIENT)
Dept: SURGERY | Facility: HOSPITAL | Age: 49
End: 2024-08-06
Payer: COMMERCIAL

## 2024-08-06 ENCOUNTER — HOSPITAL ENCOUNTER (OUTPATIENT)
Facility: HOSPITAL | Age: 49
Discharge: HOME | End: 2024-08-06
Attending: ORTHOPAEDIC SURGERY | Admitting: ORTHOPAEDIC SURGERY
Payer: COMMERCIAL

## 2024-08-06 VITALS
HEART RATE: 56 BPM | BODY MASS INDEX: 35 KG/M2 | WEIGHT: 250 LBS | TEMPERATURE: 98 F | DIASTOLIC BLOOD PRESSURE: 66 MMHG | SYSTOLIC BLOOD PRESSURE: 116 MMHG | HEIGHT: 71 IN | RESPIRATION RATE: 17 BRPM | OXYGEN SATURATION: 100 %

## 2024-08-06 DIAGNOSIS — M79.609 PAIN IN EXTREMITY, UNSPECIFIED EXTREMITY: ICD-10-CM

## 2024-08-06 DIAGNOSIS — Z01.818 PRE-OP TESTING: Primary | ICD-10-CM

## 2024-08-06 DIAGNOSIS — Z96.641 S/P HIP REPLACEMENT, RIGHT: Primary | ICD-10-CM

## 2024-08-06 DIAGNOSIS — M16.11 PRIMARY OSTEOARTHRITIS OF RIGHT HIP: ICD-10-CM

## 2024-08-06 PROCEDURE — 76942 ECHO GUIDE FOR BIOPSY: CPT | Performed by: ANESTHESIOLOGY

## 2024-08-06 PROCEDURE — 25000003 PHARM REV CODE 250: Performed by: PHYSICIAN ASSISTANT

## 2024-08-06 PROCEDURE — 71000039 HC RECOVERY, EACH ADD'L HOUR: Performed by: ORTHOPAEDIC SURGERY

## 2024-08-06 PROCEDURE — 64450 NJX AA&/STRD OTHER PN/BRANCH: CPT | Performed by: ANESTHESIOLOGY

## 2024-08-06 PROCEDURE — 25000003 PHARM REV CODE 250: Performed by: NURSE ANESTHETIST, CERTIFIED REGISTERED

## 2024-08-06 PROCEDURE — 63600175 PHARM REV CODE 636 W HCPCS: Performed by: NURSE ANESTHETIST, CERTIFIED REGISTERED

## 2024-08-06 PROCEDURE — 27130 TOTAL HIP ARTHROPLASTY: CPT | Mod: RT,,, | Performed by: ORTHOPAEDIC SURGERY

## 2024-08-06 PROCEDURE — 63600175 PHARM REV CODE 636 W HCPCS: Performed by: ANESTHESIOLOGY

## 2024-08-06 PROCEDURE — 97535 SELF CARE MNGMENT TRAINING: CPT

## 2024-08-06 PROCEDURE — 97116 GAIT TRAINING THERAPY: CPT

## 2024-08-06 PROCEDURE — 37000009 HC ANESTHESIA EA ADD 15 MINS: Performed by: ORTHOPAEDIC SURGERY

## 2024-08-06 PROCEDURE — 97530 THERAPEUTIC ACTIVITIES: CPT

## 2024-08-06 PROCEDURE — 27201423 OPTIME MED/SURG SUP & DEVICES STERILE SUPPLY: Performed by: ORTHOPAEDIC SURGERY

## 2024-08-06 PROCEDURE — 99900035 HC TECH TIME PER 15 MIN (STAT)

## 2024-08-06 PROCEDURE — 63600175 PHARM REV CODE 636 W HCPCS: Performed by: PHYSICIAN ASSISTANT

## 2024-08-06 PROCEDURE — 36000710: Performed by: ORTHOPAEDIC SURGERY

## 2024-08-06 PROCEDURE — C1776 JOINT DEVICE (IMPLANTABLE): HCPCS | Performed by: ORTHOPAEDIC SURGERY

## 2024-08-06 PROCEDURE — 37000008 HC ANESTHESIA 1ST 15 MINUTES: Performed by: ORTHOPAEDIC SURGERY

## 2024-08-06 PROCEDURE — 97162 PT EVAL MOD COMPLEX 30 MIN: CPT

## 2024-08-06 PROCEDURE — 63600175 PHARM REV CODE 636 W HCPCS: Performed by: ORTHOPAEDIC SURGERY

## 2024-08-06 PROCEDURE — 97165 OT EVAL LOW COMPLEX 30 MIN: CPT

## 2024-08-06 PROCEDURE — 94761 N-INVAS EAR/PLS OXIMETRY MLT: CPT

## 2024-08-06 PROCEDURE — 71000016 HC POSTOP RECOV ADDL HR: Performed by: ORTHOPAEDIC SURGERY

## 2024-08-06 PROCEDURE — 25000003 PHARM REV CODE 250: Performed by: ORTHOPAEDIC SURGERY

## 2024-08-06 PROCEDURE — 36000711: Performed by: ORTHOPAEDIC SURGERY

## 2024-08-06 PROCEDURE — 71000033 HC RECOVERY, INTIAL HOUR: Performed by: ORTHOPAEDIC SURGERY

## 2024-08-06 PROCEDURE — 71000015 HC POSTOP RECOV 1ST HR: Performed by: ORTHOPAEDIC SURGERY

## 2024-08-06 DEVICE — BIOLOX DELTA TS CERAMIC FEMORAL HEAD 12/14 TAPER REVISION DIAMETER 36MM +1.5
Type: IMPLANTABLE DEVICE | Site: HIP | Status: FUNCTIONAL
Brand: BIOLOX DELTA

## 2024-08-06 DEVICE — EMPHASYS ACETABULAR SHELL THREE-HOLE 52MM CEMENTLESS
Type: IMPLANTABLE DEVICE | Site: HIP | Status: FUNCTIONAL
Brand: EMPHASYS

## 2024-08-06 DEVICE — BIOLOX DELTA TS CERAMIC FEMORAL HEAD 12/14 TAPER REVISION DIAMETER 36MM +8.5
Type: IMPLANTABLE DEVICE | Site: HIP | Status: FUNCTIONAL
Brand: BIOLOX DELTA

## 2024-08-06 DEVICE — ACTIS DUOFIX HIP PROSTHESIS (FEMORAL STEM 12/14 TAPER CEMENTLESS SIZE 7 HIGH COLLAR)  CE
Type: IMPLANTABLE DEVICE | Site: HIP | Status: FUNCTIONAL
Brand: ACTIS

## 2024-08-06 RX ORDER — PHENYLEPHRINE HYDROCHLORIDE 10 MG/ML
INJECTION INTRAVENOUS
Status: DISCONTINUED | OUTPATIENT
Start: 2024-08-06 | End: 2024-08-06

## 2024-08-06 RX ORDER — MUPIROCIN 20 MG/G
1 OINTMENT TOPICAL 2 TIMES DAILY
Status: DISCONTINUED | OUTPATIENT
Start: 2024-08-06 | End: 2024-08-06 | Stop reason: HOSPADM

## 2024-08-06 RX ORDER — PROPOFOL 10 MG/ML
VIAL (ML) INTRAVENOUS
Status: DISCONTINUED | OUTPATIENT
Start: 2024-08-06 | End: 2024-08-06

## 2024-08-06 RX ORDER — ACETAMINOPHEN 500 MG
1000 TABLET ORAL EVERY 6 HOURS
Status: DISCONTINUED | OUTPATIENT
Start: 2024-08-06 | End: 2024-08-06 | Stop reason: HOSPADM

## 2024-08-06 RX ORDER — FENTANYL CITRATE 50 UG/ML
INJECTION, SOLUTION INTRAMUSCULAR; INTRAVENOUS
Status: DISCONTINUED | OUTPATIENT
Start: 2024-08-06 | End: 2024-08-06

## 2024-08-06 RX ORDER — ONDANSETRON HYDROCHLORIDE 2 MG/ML
4 INJECTION, SOLUTION INTRAVENOUS EVERY 8 HOURS PRN
Status: DISCONTINUED | OUTPATIENT
Start: 2024-08-06 | End: 2024-08-06 | Stop reason: HOSPADM

## 2024-08-06 RX ORDER — FENTANYL CITRATE 50 UG/ML
25 INJECTION, SOLUTION INTRAMUSCULAR; INTRAVENOUS EVERY 5 MIN PRN
Status: DISCONTINUED | OUTPATIENT
Start: 2024-08-06 | End: 2024-08-06 | Stop reason: HOSPADM

## 2024-08-06 RX ORDER — VANCOMYCIN HYDROCHLORIDE 1 G/20ML
INJECTION, POWDER, LYOPHILIZED, FOR SOLUTION INTRAVENOUS
Status: DISCONTINUED | OUTPATIENT
Start: 2024-08-06 | End: 2024-08-06 | Stop reason: HOSPADM

## 2024-08-06 RX ORDER — SODIUM CHLORIDE 0.9 % (FLUSH) 0.9 %
10 SYRINGE (ML) INJECTION
Status: CANCELLED | OUTPATIENT
Start: 2024-08-06

## 2024-08-06 RX ORDER — PREGABALIN 75 MG/1
75 CAPSULE ORAL
Status: COMPLETED | OUTPATIENT
Start: 2024-08-06 | End: 2024-08-06

## 2024-08-06 RX ORDER — OXYCODONE HYDROCHLORIDE 5 MG/1
10 TABLET ORAL
Status: DISCONTINUED | OUTPATIENT
Start: 2024-08-06 | End: 2024-08-06 | Stop reason: HOSPADM

## 2024-08-06 RX ORDER — OXYCODONE HYDROCHLORIDE 5 MG/1
5 TABLET ORAL
Status: DISCONTINUED | OUTPATIENT
Start: 2024-08-06 | End: 2024-08-06 | Stop reason: HOSPADM

## 2024-08-06 RX ORDER — MORPHINE SULFATE 2 MG/ML
2 INJECTION, SOLUTION INTRAMUSCULAR; INTRAVENOUS
Status: DISCONTINUED | OUTPATIENT
Start: 2024-08-06 | End: 2024-08-06 | Stop reason: HOSPADM

## 2024-08-06 RX ORDER — PROCHLORPERAZINE EDISYLATE 5 MG/ML
5 INJECTION INTRAMUSCULAR; INTRAVENOUS EVERY 6 HOURS PRN
Status: DISCONTINUED | OUTPATIENT
Start: 2024-08-06 | End: 2024-08-06 | Stop reason: HOSPADM

## 2024-08-06 RX ORDER — MUPIROCIN 20 MG/G
1 OINTMENT TOPICAL
Status: COMPLETED | OUTPATIENT
Start: 2024-08-06 | End: 2024-08-06

## 2024-08-06 RX ORDER — FENTANYL CITRATE 50 UG/ML
50 INJECTION, SOLUTION INTRAMUSCULAR; INTRAVENOUS
Status: DISCONTINUED | OUTPATIENT
Start: 2024-08-06 | End: 2024-08-06 | Stop reason: HOSPADM

## 2024-08-06 RX ORDER — ASPIRIN 81 MG/1
81 TABLET ORAL 2 TIMES DAILY
Status: DISCONTINUED | OUTPATIENT
Start: 2024-08-06 | End: 2024-08-06 | Stop reason: HOSPADM

## 2024-08-06 RX ORDER — SODIUM CHLORIDE 9 MG/ML
INJECTION, SOLUTION INTRAVENOUS
Status: COMPLETED | OUTPATIENT
Start: 2024-08-06 | End: 2024-08-06

## 2024-08-06 RX ORDER — GLUCAGON 1 MG
1 KIT INJECTION
Status: CANCELLED | OUTPATIENT
Start: 2024-08-06

## 2024-08-06 RX ORDER — POLYETHYLENE GLYCOL 3350 17 G/17G
17 POWDER, FOR SOLUTION ORAL DAILY
Status: DISCONTINUED | OUTPATIENT
Start: 2024-08-06 | End: 2024-08-06 | Stop reason: HOSPADM

## 2024-08-06 RX ORDER — GLUCAGON 1 MG
1 KIT INJECTION
Status: DISCONTINUED | OUTPATIENT
Start: 2024-08-06 | End: 2024-08-06 | Stop reason: HOSPADM

## 2024-08-06 RX ORDER — SODIUM CHLORIDE 9 MG/ML
INJECTION, SOLUTION INTRAVENOUS CONTINUOUS
Status: DISCONTINUED | OUTPATIENT
Start: 2024-08-06 | End: 2024-08-06 | Stop reason: HOSPADM

## 2024-08-06 RX ORDER — TRANEXAMIC ACID 100 MG/ML
INJECTION, SOLUTION INTRAVENOUS
Status: DISCONTINUED | OUTPATIENT
Start: 2024-08-06 | End: 2024-08-06

## 2024-08-06 RX ORDER — HALOPERIDOL 5 MG/ML
0.5 INJECTION INTRAMUSCULAR EVERY 10 MIN PRN
Status: CANCELLED | OUTPATIENT
Start: 2024-08-06

## 2024-08-06 RX ORDER — NALOXONE HCL 0.4 MG/ML
0.02 VIAL (ML) INJECTION
Status: DISCONTINUED | OUTPATIENT
Start: 2024-08-06 | End: 2024-08-06 | Stop reason: HOSPADM

## 2024-08-06 RX ORDER — POLYETHYLENE GLYCOL 3350 17 G/17G
17 POWDER, FOR SOLUTION ORAL DAILY PRN
Status: DISCONTINUED | OUTPATIENT
Start: 2024-08-06 | End: 2024-08-06 | Stop reason: HOSPADM

## 2024-08-06 RX ORDER — METHOCARBAMOL 500 MG/1
1000 TABLET, FILM COATED ORAL ONCE
Status: CANCELLED | OUTPATIENT
Start: 2024-08-06 | End: 2024-08-06

## 2024-08-06 RX ORDER — ROPIVACAINE/EPI/CLONIDINE/KET 2.46-0.005
SYRINGE (ML) INJECTION ONCE
Status: DISCONTINUED | OUTPATIENT
Start: 2024-08-06 | End: 2024-08-06 | Stop reason: HOSPADM

## 2024-08-06 RX ORDER — FENTANYL CITRATE 50 UG/ML
25 INJECTION, SOLUTION INTRAMUSCULAR; INTRAVENOUS EVERY 5 MIN PRN
Status: CANCELLED | OUTPATIENT
Start: 2024-08-06

## 2024-08-06 RX ORDER — DEXAMETHASONE SODIUM PHOSPHATE 4 MG/ML
INJECTION, SOLUTION INTRA-ARTICULAR; INTRALESIONAL; INTRAMUSCULAR; INTRAVENOUS; SOFT TISSUE
Status: DISCONTINUED | OUTPATIENT
Start: 2024-08-06 | End: 2024-08-06

## 2024-08-06 RX ORDER — CELECOXIB 200 MG/1
200 CAPSULE ORAL DAILY
Status: DISCONTINUED | OUTPATIENT
Start: 2024-08-06 | End: 2024-08-06 | Stop reason: HOSPADM

## 2024-08-06 RX ORDER — LIDOCAINE HYDROCHLORIDE 20 MG/ML
INJECTION INTRAVENOUS
Status: DISCONTINUED | OUTPATIENT
Start: 2024-08-06 | End: 2024-08-06

## 2024-08-06 RX ORDER — ONDANSETRON HYDROCHLORIDE 2 MG/ML
INJECTION, SOLUTION INTRAVENOUS
Status: DISCONTINUED | OUTPATIENT
Start: 2024-08-06 | End: 2024-08-06

## 2024-08-06 RX ORDER — AMOXICILLIN 250 MG
1 CAPSULE ORAL 2 TIMES DAILY
Status: DISCONTINUED | OUTPATIENT
Start: 2024-08-06 | End: 2024-08-06 | Stop reason: HOSPADM

## 2024-08-06 RX ORDER — BUPIVACAINE HYDROCHLORIDE 2.5 MG/ML
INJECTION, SOLUTION EPIDURAL; INFILTRATION; INTRACAUDAL
Status: COMPLETED | OUTPATIENT
Start: 2024-08-06 | End: 2024-08-06

## 2024-08-06 RX ORDER — ROPIVACAINE/EPI/CLONIDINE/KET 2.46-0.005
SYRINGE (ML) INJECTION
Status: DISCONTINUED | OUTPATIENT
Start: 2024-08-06 | End: 2024-08-06 | Stop reason: HOSPADM

## 2024-08-06 RX ORDER — MIDAZOLAM HYDROCHLORIDE 1 MG/ML
2 INJECTION, SOLUTION INTRAMUSCULAR; INTRAVENOUS
Status: DISCONTINUED | OUTPATIENT
Start: 2024-08-06 | End: 2024-08-06 | Stop reason: HOSPADM

## 2024-08-06 RX ORDER — PREGABALIN 75 MG/1
75 CAPSULE ORAL NIGHTLY
Status: DISCONTINUED | OUTPATIENT
Start: 2024-08-06 | End: 2024-08-06 | Stop reason: HOSPADM

## 2024-08-06 RX ORDER — KETAMINE HCL IN 0.9 % NACL 50 MG/5 ML
SYRINGE (ML) INTRAVENOUS
Status: DISCONTINUED | OUTPATIENT
Start: 2024-08-06 | End: 2024-08-06

## 2024-08-06 RX ORDER — OXYCODONE HYDROCHLORIDE 5 MG/1
5 TABLET ORAL
Status: CANCELLED | OUTPATIENT
Start: 2024-08-06

## 2024-08-06 RX ORDER — METHOCARBAMOL 750 MG/1
750 TABLET, FILM COATED ORAL 3 TIMES DAILY
Status: DISCONTINUED | OUTPATIENT
Start: 2024-08-06 | End: 2024-08-06 | Stop reason: HOSPADM

## 2024-08-06 RX ORDER — CELECOXIB 200 MG/1
400 CAPSULE ORAL ONCE
Status: COMPLETED | OUTPATIENT
Start: 2024-08-06 | End: 2024-08-06

## 2024-08-06 RX ORDER — PROPOFOL 10 MG/ML
VIAL (ML) INTRAVENOUS CONTINUOUS PRN
Status: DISCONTINUED | OUTPATIENT
Start: 2024-08-06 | End: 2024-08-06

## 2024-08-06 RX ORDER — LIDOCAINE HYDROCHLORIDE 10 MG/ML
1 INJECTION, SOLUTION EPIDURAL; INFILTRATION; INTRACAUDAL; PERINEURAL
Status: DISCONTINUED | OUTPATIENT
Start: 2024-08-06 | End: 2024-08-06 | Stop reason: HOSPADM

## 2024-08-06 RX ORDER — ACETAMINOPHEN 500 MG
1000 TABLET ORAL
Status: COMPLETED | OUTPATIENT
Start: 2024-08-06 | End: 2024-08-06

## 2024-08-06 RX ORDER — FAMOTIDINE 20 MG/1
20 TABLET, FILM COATED ORAL 2 TIMES DAILY
Status: DISCONTINUED | OUTPATIENT
Start: 2024-08-06 | End: 2024-08-06 | Stop reason: HOSPADM

## 2024-08-06 RX ADMIN — FENTANYL CITRATE 25 MCG: 50 INJECTION, SOLUTION INTRAMUSCULAR; INTRAVENOUS at 08:08

## 2024-08-06 RX ADMIN — FENTANYL CITRATE 25 MCG: 50 INJECTION INTRAMUSCULAR; INTRAVENOUS at 11:08

## 2024-08-06 RX ADMIN — CEFAZOLIN 2 G: 2 INJECTION, POWDER, FOR SOLUTION INTRAMUSCULAR; INTRAVENOUS at 07:08

## 2024-08-06 RX ADMIN — SODIUM CHLORIDE, SODIUM GLUCONATE, SODIUM ACETATE, POTASSIUM CHLORIDE, MAGNESIUM CHLORIDE, SODIUM PHOSPHATE, DIBASIC, AND POTASSIUM PHOSPHATE: .53; .5; .37; .037; .03; .012; .00082 INJECTION, SOLUTION INTRAVENOUS at 10:08

## 2024-08-06 RX ADMIN — FENTANYL CITRATE 100 MCG: 50 INJECTION INTRAMUSCULAR; INTRAVENOUS at 06:08

## 2024-08-06 RX ADMIN — MEPIVACAINE HYDROCHLORIDE 6 ML/HR: 15 INJECTION, SOLUTION EPIDURAL; INFILTRATION at 09:08

## 2024-08-06 RX ADMIN — PROPOFOL 50 MG: 10 INJECTION, EMULSION INTRAVENOUS at 07:08

## 2024-08-06 RX ADMIN — Medication 20 MG: at 09:08

## 2024-08-06 RX ADMIN — ACETAMINOPHEN 1000 MG: 500 TABLET ORAL at 05:08

## 2024-08-06 RX ADMIN — SODIUM CHLORIDE, SODIUM GLUCONATE, SODIUM ACETATE, POTASSIUM CHLORIDE, MAGNESIUM CHLORIDE, SODIUM PHOSPHATE, DIBASIC, AND POTASSIUM PHOSPHATE: .53; .5; .37; .037; .03; .012; .00082 INJECTION, SOLUTION INTRAVENOUS at 09:08

## 2024-08-06 RX ADMIN — SODIUM CHLORIDE: 0.9 INJECTION, SOLUTION INTRAVENOUS at 06:08

## 2024-08-06 RX ADMIN — PHENYLEPHRINE HYDROCHLORIDE 100 MCG: 10 INJECTION INTRAVENOUS at 10:08

## 2024-08-06 RX ADMIN — CELECOXIB 400 MG: 200 CAPSULE ORAL at 05:08

## 2024-08-06 RX ADMIN — DEXAMETHASONE SODIUM PHOSPHATE 8 MG: 4 INJECTION, SOLUTION INTRAMUSCULAR; INTRAVENOUS at 07:08

## 2024-08-06 RX ADMIN — VANCOMYCIN HYDROCHLORIDE 1500 MG: 1.5 INJECTION, POWDER, LYOPHILIZED, FOR SOLUTION INTRAVENOUS at 06:08

## 2024-08-06 RX ADMIN — PROPOFOL 100 MCG/KG/MIN: 10 INJECTION, EMULSION INTRAVENOUS at 07:08

## 2024-08-06 RX ADMIN — FENTANYL CITRATE 25 MCG: 50 INJECTION, SOLUTION INTRAMUSCULAR; INTRAVENOUS at 07:08

## 2024-08-06 RX ADMIN — ACETAMINOPHEN 1000 MG: 500 TABLET ORAL at 12:08

## 2024-08-06 RX ADMIN — MUPIROCIN 1 G: 20 OINTMENT TOPICAL at 05:08

## 2024-08-06 RX ADMIN — ONDANSETRON 4 MG: 2 INJECTION INTRAMUSCULAR; INTRAVENOUS at 07:08

## 2024-08-06 RX ADMIN — TRANEXAMIC ACID 1000 MG: 100 INJECTION INTRAVENOUS at 10:08

## 2024-08-06 RX ADMIN — LIDOCAINE HYDROCHLORIDE 60 MG: 20 INJECTION INTRAVENOUS at 07:08

## 2024-08-06 RX ADMIN — PREGABALIN 75 MG: 75 CAPSULE ORAL at 05:08

## 2024-08-06 RX ADMIN — TRANEXAMIC ACID 1000 MG: 100 INJECTION INTRAVENOUS at 07:08

## 2024-08-06 RX ADMIN — SODIUM CHLORIDE: 0.9 INJECTION, SOLUTION INTRAVENOUS at 05:08

## 2024-08-06 RX ADMIN — MEPIVACAINE HYDROCHLORIDE 3 ML: 15 INJECTION, SOLUTION EPIDURAL; INFILTRATION at 07:08

## 2024-08-06 RX ADMIN — OXYCODONE 5 MG: 5 TABLET ORAL at 11:08

## 2024-08-06 RX ADMIN — MIDAZOLAM HYDROCHLORIDE 2 MG: 1 INJECTION, SOLUTION INTRAMUSCULAR; INTRAVENOUS at 06:08

## 2024-08-06 RX ADMIN — GLYCOPYRROLATE 0.2 MG: 0.2 INJECTION, SOLUTION INTRAMUSCULAR; INTRAVENOUS at 07:08

## 2024-08-06 RX ADMIN — Medication 10 MG: at 10:08

## 2024-08-06 RX ADMIN — BUPIVACAINE HYDROCHLORIDE 20 ML: 2.5 INJECTION, SOLUTION EPIDURAL; INFILTRATION; INTRACAUDAL; PERINEURAL at 06:08

## 2024-08-06 NOTE — PLAN OF CARE
VSS. Pt able to tolerate oral liquids. Pt reports tolerable pain level for D/C. Ice pack and dressing intact. Spouse received home meds per bedside delivery. Walker in the car. No distress noted. Pt states he is ready for D/C. D/C instructions reviewed with pt and spouse, verbalized understanding.

## 2024-08-06 NOTE — PLAN OF CARE
Problem: Physical Therapy  Goal: Physical Therapy Goal  Description: Goals to be met by: 8/6/24    Pt met goals at eval to demonstrate safe level of mobility for d/c home with his wife to assist PRN and OPPT to being 8/8/24.       Patient demonstrates a mobility limitation that significantly impairs their ability to participate in one or more mobility related activities of daily living. Patient's mobility limitation cannot be sufficiently resolved with the use of a cane, but can be sufficiently resolved with the use of a rolling walker.The use of a rolling walker will considerably improve their ability to participate in MRADLs. Patient will use the walker on a regular basis at home.        Outcome: Met

## 2024-08-06 NOTE — DISCHARGE INSTRUCTIONS
[address], [place]                                                                          [phone]                   Postoperative Instructions for Hip Surgery                 Your Surgery Included:   Open               Arthroscopic   [] Fracture Fixation       []  Diagnostic   [] Tendon Repair      [] Synovectomy [x] Joint Replacement      [] Debridement / Chondroplasty [] Articular Cartilage Repair      [] Articular Cartilage Repair           [x] Anterior Approach             []   Microfracture          [] Anterolateral Approach       [] Labral Repair         [] Posterior Approach      [] Osteoplasty               []   Femoral  []  Acetabular        [] Psoas Tendon Release          [] Tendon Repair  [] Amniox Arthrocentesis                  Call our office ([phone]) immediately or message us through [facility] if you experience any of the following:       Excessive bleeding or pus like drainage at the incision site       Uncontrollable pain not relieved by pain medication       Excessive swelling or redness at the incision site       Fever above 101.5 degrees not controlled with Tylenol or Motrin       Shortness of Breath or severe calf pain       Any foul odor or blistering from the surgery site    FOR EMERGENCIES: If any unusual problems or difficulties occur, call our office at [phone], or page the  at [phone] who will direct your call appropriately. If your procedure is a total joint replacement, please call the number on your wristband.     1.   Multimodal Pain Management: A cold therapy cuff, pain medications, anti-inflammatories, local injections, TENs unit, and in some cases, regional anesthesia injections are used to manage your post-operative pain. The decision to use each of these options is based on their risks and benefits.     Medications: You were given one or more of the following medication prescriptions during your preoperative  appointment. Follow the instruction on the bottles.     Narcotic Medication (usually Percocet, Roxicodone, or Norco): Begin taking the medication before your hip starts to hurt. Some patients do not like to take any medication, but if you wait until your pain is severe before taking, you will be very uncomfortable for several hours waiting for the narcotic to work. Always take with food.     Nausea / Vomiting: For this issue, we prescribe Phenergan or Zofran, use this medication as directed.     Cold Therapy: You may have been sent home with an ice wrap. The cold can provide short-term pain relief, and limits swelling by reducing blood flow to the injured area. Apply the cold gel pack for 20 minutes and then take it off for 20 minutes. Use throughout the day, as needed to help relieve pain and control swelling.      Regional Anesthesia Injections (Blocks): You may have been given a regional nerve block either before or after surgery. This may make your entire leg numb for 24-36 hours.                            * Proceed with caution when bearing weight on your leg.     2.   Diet: Eat a bland diet for the first day after surgery. Progress your diet as tolerated. Constipation may occur with Narcotic usage. We recommend Colace 100 mg twice a day while taking narcotics.    3.   Activity: Limit your activity during the first 48 hours, keep your leg elevated with pillows under your heel. After the first 48 hours at home, increase your activity level based on your symptoms.    4.   Dressing Change: (c) The soft, bulky dressing will be removed on the 3rd day after surgery. The Aquacel dressing (tan, long adhesive bandage) will remain on until the 1st post operative appointment. Do not remove. It is normal for some blood to be seen on the dressings. It is also normal for you to see apparent bruising on the skin around your incisions. If you are concerned by the drainage or the appearance of your wound site, you can send a  "picture via [facility].    5.   Showering: (c) You may shower on the 3rd day after surgery. The Aquacel bandage is to be covered with saran wrap before showering. Do not submerge limb in any water for 4 weeks or until incisions completely healed.  Do not get bandage wet.    6.   Your procedure did not require a post-operative brace.    7.   Your procedure did not require a Continuous Passive Motion (CPM) device.    8.   Weight Bearing: You may have been sent home with crutches. If instructed (see below), use these crutches at all times unless at complete rest.                  [x] Full weight bearing            [x]  NOW        9.  Home Exercises: Begin these exercises the first day after surgery in order to help you regain your motion and strength. You may do the following marked exercises:       [x] Straight Leg Raise (SLR) - While jennifer your quadriceps muscle, lift     your fully extended leg to the level of your non-operative knee (as shown)         [x] Heel Slides - With the knee straight, slide your heel slowly toward your   buttocks, hold at the endpoint for 10-15 seconds, then slowly straighten       [x] Ankle pumps - With your knee straight, move your ankle in a "pumping"    fashion to activate your calf and leg muscles      10.  Physical Therapy: Physical therapy is an essential component to your recovery from surgery. Your physical therapy will start in 1 days.    11.  Dislocation Precautions: Dislocation of an artificial hip is uncommon but may occur within the first three months after surgery. The problem usually starts with a popping or slipping sensation. If the ball dislocates, you will be unable to put weight on the affected limb and will most likely experience discomfort in your hip. You should contact your orthopedic surgeon immediately and probably have someone take you to the emergency room.    Anterior Approach:  Avoid extending the hip, turning the operative leg outward, take shorter steps " with your operative leg and longer steps with your nonoperative leg          FIRST POSTOPERATIVE VISIT: As scheduled.

## 2024-08-06 NOTE — OP NOTE
[facility] ([facility])  Operative Note      Date of Procedure: 8/6/2024     Procedure:   1. Right  Replacement, hip, acetabular and proximal femoral prosthesis (Total Hip arthroplasty), with or without autograft or allograft [id number]    Surgeons and Role:     * Merlyn Patino MD - Primary    Assisting Surgeon: None    Pre-Operative Diagnosis: Right hip pain [M25.551]    Post-Operative Diagnosis: Post-Op Diagnosis Codes:     * Right hip pain [M25.551]    Anesthesia: General    Operative Findings (including complications, if any): end-stage right hip arthritis    Description of Technical Procedures:   DATE OF PROCEDURE: 8/6/2024    ATTENDING SURGEON: Surgeons and Role:     * Merlyn Patino MD - Primary    Assistants:  MD Jacklein Pandey PA-C    It was medically necessary for Eleanor Marques MD to perform first assistant duties setup , exposure and closure.     PREOPERATIVE DIAGNOSIS:  Right  DJD, Hip M16.9 and Pain, Hip M25.559    POSTOPERATIVE DIAGNOSIS:   Right  DJD, Hip M16.9 and Pain, Hip M25.559    PROCEDURES(S) PERFORMED:   1. Right  Replacement, hip, acetabular and proximal femoral prosthesis (Total Hip arthroplasty), with or without autograft or allograft [id number]    ANESTHESIA: General, Local, and Regional w/o Catheter  SANGEETHA    FLUIDS IN THE CASE: 1500 ml    ESTIMATED BLOOD LOSS: 200 mL    URINE OUTPUT: 0 ml    COMPLICATIONS: none    INDICATIONS FOR OPERATIVE PROCEDURE:   Eliel Coffman is a 49 y.o. male with history of right hip pain and pathology. The patient's history and physical examination findings consistent with the procedure performed. The patient noted significant problems in the area of concern with problems on activities of daily living and aggressive use of the right leg. As a result of these problems and problems with overall activity level, the patient was deemed to be an appropriate candidate for operative intervention. Nonoperative versus operative options were  discussed. The risks and benefits were discussed with the patient. The patient acknowledged understanding and wished to proceed with operative intervention. Informed consent was obtained prior to the procedure. Details of the surgical procedure were explained, including incisions and probable rehabilitation course. The patient understands the likely length of convalescence after surgery; and we have explained the risks, benefits, and alternatives of surgery. Reasonable expectations and potential complications were discussed and acknowledged, including but not limited to infection, bleeding, blood clots, (DVT and/or PE), nerve injury, retear, instability, continued pain and stiffness. It was also explained that there was a chance of failure which may require further management. The patient agreed and understood and wished to proceed.       IMPLANTS UTILIZED: HANA Table, Depuy Hip Set, and Oscillating Saw  DePuy [place] Gription acetabular shell sector 53 mm outer diameter component  DePuy [place] AltrX polyethylene acetabular liner neutral, 36 mm inner diameter, 53 mm outer diameter  DePuy Actis Duofix hip system cementless femoral stem high offset Collar size 7  DePuy Biolox Delta Ceramic femoral head 36 mm, +8.5, 12-14 taper.    DESCRIPTION OF PROCEDURE: The patient was brought into the Operating Room,   placed in supine position. Upon application of general anesthetic as well as   placement of endotracheal tube to stabilize the area, the patient was given the   appropriate dose of antibiotics based on body weight. Time-out was utilized to   verify  right side as the operative site.    The patient was then placed onto the [place] table. The pudendal post was applied.   Both feet were carefully padded and placed into the [place] table   foot attachments. Appropriate rotation was verified and C-arm was used to   obtain fluoroscopic views and maintain appropriate leg lengths intraoperatively.  Right leg was then prepped  and draped in a sterile fashion with ChloraPrep   material with the right arm carefully draped across the chest. Left arm was   placed well arm ramires. Anterior based incision was carried down through the   skin down the fat and fascia. The tensor fascia markos and sartorius interval was  then created and the tensor fascia markos region retracted laterally. The   lateral edge of the rectus femoris region was then released. The crossing   vessels were carefully ligated and cauterized using Aquamantys probe. The   anterior capsular fat was then removed. Capsule was then incised in a T-shaped   incision region. Releases were performed medially as well as anteriorly and   posteriorly. We then exposed the neck region with care to protect the femoral nerves   and vessels throughout the remaining portion of the procedure. An oscillating saw was   used to create the proximal osteotomy of the femoral neck. We then removed additional   osteophytic bases along the periphery of the acetabulum with a rongeur and   osteotomes. The head was then removed. We then placed retractors   anteroinferiorly and posteroinferiorly along the acetabular region.    Sequential reaming was performed up to a size 52 mm reamer and then trialed with  the 53 mm acetabular component. We felt that a 53 mm component would most   normalize the patient's anatomy. As a result, Gription 53 mm cup was then   applied, was tapped in position under fluoroscopic guidance, found to be   excellent position. An anterior lip liner was then applied to the area of   concern to avoid any postoperative instability. Irrigation performed along the   area of concern. We then released further along the posterior capsular region   as well as along the anterior and posterior aspect of the femoral neck. The   hook attachment from the [place] table was then applied. [place] table was then used   to retract the femur anteriorly with external rotation and adduction. We   exposed the area  "of concern with medial retractors placed medially and   posteriorly as well as proximally. We used the cookie cutting device to   remove bone from the piriformis fossa region. Using the "chili" pepper and Concise device as well as   the canal finder we verified the appropriate location within the femoral canal. We then sequentially broached,   starting with the chili pepper device and extending up to 7 broach. We then calcar reamer along the area of concern,   placed trials and felt that with sequential placement of trial neck lengths and leg lengths were created with the +8.5 neck.     As a result, we removed all trial components, placed the   Corail stem into the area of concern. Fit in excellent fashion. Using modular   technique to place the +8.5, 36-mm Biolox Delta Ceramic head. We then irrigated   along the area of concern, removing the bony fragments from the acetabular region and reduced the hip.     Final pictures demonstrating excellent recreation of normal anatomy   leg lengths. 1:1 mixture iodine and NS was placed followed by use of the Pulsavac  To remove the remnants of the iodine. As a result, we then irrigated copiously along the area of concern.  We then closed the fascia and deep soft tissue layers with a series of #1 Vicryl   placed in figure-of-eight fashion, and the same interval was closed with a running # 1 Stratafix suture followed by closure of the subcutaneous tissues and fascial   region with a series of # 1 and 3-0 Vicryl sutures placed in inverted fashion. Skin was  closed with running 3-0 Monocryl sutures placed in subcuticular fashion along   with application of Dermabond ointment and aquacel. We injected intracapsular and   intramuscular DERBA mixture into the hip joint with a 20-gauge needle prior to  closure of the area of concern. We did place Xeroform gauze, ABD pads and   Medipore tape. The patient's hip was then placed into an abduction pillow with   cooling unit applied on the " operative hip. The patient was allowed to recover from   the anesthetic, was extubated and was taken to Recovery Room in stable   condition. At the completion case, all instrument and sponge counts were   correct.    NOTE: Dr. Merlyn Patino was present for the key portions of the procedure and did  perform the key parts of the procedure.    PHYSICAL THERAPY:   The patient should begin outpatient physical therapy on postop day # 1-3     While in the hospital the patient should be OOB to chair QID with Full on the operative leg with walker   or crutches and nursing or PT assistance; Ambulation should be with above weightbearing status  Full range of motion can be undertaken with full flexion as well as full internal and external  rotation as tolerated with anterior hip precautions.   The patient should use walker or   crutches and advancing off as tolerated.     Significant Surgical Tasks Conducted by the Assistant(s), if Applicable: preparation and closure    Estimated Blood Loss (EBL): * No values recorded between 8/6/2024  7:58 AM and 8/6/2024 10:41 AM *           Implants:   Implant Name Type Inv. Item Serial No.  Lot No. LRB No. Used Action   emphasys acetabular shell three-hole 52mm cementless    [employer] [id number] Right 1 Implanted   emphasys polyethylene liner neutral AOX 52 mm 36 mm    [employer] [id number] Right 1 Implanted   HEAD FEM CRMIC BIOLX 36MM +1.5 - [id number]  HEAD FEM CRMIC BIOLX 36MM +1.5  [employer] [id number] Right 1 Implanted   STEM ACTIS FEM COLLARED HIGH 7 - [id number]  STEM ACTIS FEM COLLARED HIGH 7  SYN[employer]ES [id number] Right 1 Implanted   HEAD FEM [facility] DELTA 36MM +8.5 - [id number]  HEAD FEM [facility] DELTA 36MM +8.5  [employer] [id number] Right 1 Implanted       Specimens:   Specimen (24h ago, onward)      None                    Condition: Good    Disposition: PACU - hemodynamically stable.    Attestation: I was present and scrubbed for the key portions of the procedure.    Discharge  Note    OUTCOME: Patient tolerated treatment/procedure well without complication and is now ready for discharge.    DISPOSITION: Home or Self Care    FINAL DIAGNOSIS:  <principal problem not specified>    FOLLOWUP: In clinic    DISCHARGE INSTRUCTIONS:    Discharge Procedure Orders   CBC Auto Differential   Standing Status: Future Number of Occurrences: 1 Standing Exp. Date: 09/17/25     Comprehensive Metabolic Panel   Standing Status: Future Number of Occurrences: 1 Standing Exp. Date: 09/17/25     Protime-INR   Standing Status: Future Number of Occurrences: 1 Standing Exp. Date: 09/17/25     EKG 12-lead   Standing Status: Future Number of Occurrences: 1 Standing Exp. Date: 07/19/25

## 2024-08-06 NOTE — PT/OT/SLP EVAL
"Occupational Therapy   Evaluation and Discharge Note    Name: Eliel Coffman  MRN: [id number]  Admitting Diagnosis: <principal problem not specified>  Recent Surgery: Procedure(s) (LRB):  ARTHROPLASTY, HIP (Right) Day of Surgery    Recommendations:     Discharge Recommendations: Low Intensity Therapy  Discharge Equipment Recommendations: none  Barriers to discharge:  None    Assessment:     Eliel Coffman is a 49 y.o. male with a medical diagnosis of <principal problem not specified>. Pt was willing to participate and tolerated session well overall. Pt was able to perform functional mobility and ADLs assessed with little to no assistance. Pt demonstrated good functional endurance with ambulation to bathroom and good safety awareness with RW during t/f's. Pt is currently functioning at appropriate independence levels with adequate support at home to be discharged from acute OT services at this time.     Pt will have support from wife upon d/c.     Plan:     During this hospitalization, patient does not require further acute OT services.  Please re-consult if situation changes.    Plan of Care Reviewed with: patient, spouse    Subjective     Chief Complaint: RLE pain  Patient/Family Comments/goals: "I have a lot of the stuff I would need already."    Occupational Profile:  Living Environment: SSH, 5 STEc/ BHR; WIS, standard toilet; lives c/ wife  Previous level of function: indep, drives  Roles and Routines: , BRIAN Officer  Equipment Used at home: bedside commode, shower chair, walker, rolling, cane, straight (equipment given to pt from family member)  Assistance upon Discharge: wife    Pain/Comfort:  Pain Rating 1: 3/10  Location - Side 1: Right  Location 1: hip  Pain Addressed 1: Reposition, Distraction, Pre-medicate for activity  Pain Rating Post-Intervention 1:  (not rated)    Patients cultural, spiritual, Tenriism conflicts given the current situation: no    Objective:     Communicated with: RN prior to " session.  Patient found supine with pulse ox (continuous), telemetry, blood pressure cuff, cryotherapy, SCD, hip abduction pillow upon OT entry to room.    General Precautions: Standard, fall  Orthopedic Precautions: RLE anterior precautions  Braces: N/A  Respiratory Status: Room air     Occupational Performance:    Bed Mobility:    Patient completed Rolling/Turning to Right with supervision  Patient completed Scooting/Bridging with supervision  Patient completed Supine to Sit with supervision    Functional Mobility/Transfers:  Patient completed Sit <> Stand Transfer with stand by assistance  with  rolling walker   Patient completed Toilet Transfer Step Transfer technique with stand by assistance with  rolling walker  W/c t/f: SBA c/ RW  Functional Mobility: from bed to bathroom back to room; SBA c/ RW; no LOB  Min vc's for sequencing c/ t/f's    Activities of Daily Living:  Grooming: pt declined to perform    Upper Body Dressing: setup/SPV; pt donned personal shirt sitting eob    Lower Body Dressing: setup/Min A; pt donned personal shorts sitting eob    Toileting: pt unable to void at this time      Cognitive/Visual Perceptual:  Cognitive/Psychosocial Skills:     -       Oriented to: Person, Place, Time, and Situation   -       Follows Commands/attention:Follows multistep  commands  -       Safety awareness/insight to disability: intact     Physical Exam:  Balance:    -       static standing balance: SBA; dynamic standing balance: SBA  Upper Extremity Range of Motion:     -       Right Upper Extremity: WFL  -       Left Upper Extremity: WFL  Upper Extremity Strength:    -       Right Upper Extremity: WFL  -       Left Upper Extremity: WFL   Strength:    -       Right Upper Extremity: WFL  -       Left Upper Extremity: WFL  Fine Motor Coordination:    -       Intact  Left hand, manipulation of objects and Right hand, manipulation of objects    AMPAC 6 Click ADL:  AMPAC Total Score: 22    Treatment &  Education:  Pt edu on role of OT, POC, safety when performing self care tasks , benefit of performing OOB activity, and safety when performing functional transfers and mobility.  - White board updated  - Self care tasks completed-- as noted above    - pt educated on anterior hip precautions  - pt educated on proper sequencing for LB dressing    Patient left  up in w/c  with  RN notified and wife present      History:     Past Medical History:   Diagnosis Date    Anxiety     Arthritis     Personal history of colonic polyps 04/06/2023    Sleep apnea     Testicular failure          Past Surgical History:   Procedure Laterality Date    COLONOSCOPY N/A 4/6/2023    Procedure: COLONOSCOPY;  Surgeon: Augusta Eubanks MD;  Location: [facility];  Service: Endoscopy;  Laterality: N/A;    INCISION AND DRAINAGE Left     hand    VASECTOMY         Time Tracking:     OT Date of Treatment: 08/06/24  OT Start Time: 1334  OT Stop Time: 1406  OT Total Time (min): 32 min    Billable Minutes:Evaluation 8  Self Care/Home Management 14  Therapeutic Activity 10    8/6/2024

## 2024-08-06 NOTE — PT/OT/SLP EVAL
"Physical Therapy Evaluation and Treatment and Discharge Summary    Patient Name: Eliel Coffman   MRN: [id number]  Recent Surgery: Procedure(s) (LRB):  ARTHROPLASTY, HIP (Right) Day of Surgery    Recommendations:     Discharge Recommendations: Low Intensity Therapy   Discharge Equipment Recommendations: shower chair, walker, rolling, raised toilet   Barriers to discharge: None    Assessment:     Eliel Coffman is a 49 y.o. male admitted with a medical diagnosis of OA R Hip. He presents with the following impairments/functional limitations: impaired endurance, impaired self care skills, impaired functional mobility, gait instability, decreased lower extremity function, pain, decreased ROM, edema, orthopedic precautions. Pt presents s/p R MARCELINO with expected post-op deficits.  Pt did really well with PT today and was able to amb 65' and 20' with RW and SBA .    He ascended/descended 4 steps with R rail and CGA. Pt had no LOB and no dizziness during PT.    Pt is ready for d/c home today from PT standpoint with his wife  to assist PRN and  will benefit from OPPT for cont PT to maximize  functional recovery, strength and ROM.        Rehab Prognosis: Good; patient would benefit from cont PT services post d/c to address these deficits and reach maximum level of function.    Plan:     During this hospitalization, patient to be d/c home with his wife to assist PRN and OPPT to address the above listed problems via gait training, therapeutic activities, therapeutic exercises    Plan of Care Expires: 09/05/24    Subjective     Chief Complaint: " I want this IV out of my hand"  Patient Comments/Goals: to go back to work as a   Pain/Comfort:  Pain Rating 1: 7/10  Location - Side 1: Right  Location 1: hip  Pain Addressed 1: Pre-medicate for activity, Reposition, Distraction  Pain Rating Post-Intervention 1: 7/10    Social History:  Living Environment: Patient lives with their spouse in a single story home with number of " outside stair(s): 5 UMESH with R rail only  Prior Level of Function: Prior to admission, patient was independent  Equipment Used at Home: none  DME owned (not currently used):  walk in shower and shower chair (borrowed), rolling walker, and single point cane  Assistance Upon Discharge:  his wife    Objective:     Communicated with RN and OT prior to session. Patient found up in chair with peripheral IV upon PT entry to room.  Pt's wife present in room and for PT session    General Precautions: Standard, fall   Orthopedic Precautions: RLE anterior precautions, RLE weight bearing as tolerated   Braces: N/A    Respiratory Status: Room air    Exams:  RLE ROM: WFL except ant hip precautions  RLE Strength:  at least 3/5  LLE ROM: WNL  LLE Strength: WNL  Cognitive: Patient is oriented to Person, Place, Time, Situation  Gross Motor Coordination: WFL  Postural Exam: Patient presented with the following abnormalities:    -       No postural abnormalities identified  Sensation:    -       Intact    Functional Mobility:  Gait belt applied - Yes  Bed Mobility  Seated in chair at start of session and returned to chair  Transfers  Sit to Stand: stand by assistance with rolling walker and with cues for hand placement and foot placement  Gait  Patient ambulated 85' and 20' with rolling walker and stand by assistance. Patient required cues for heel strike and upright posture to increase independence and safety. Patient required cues ~ 25% of the time.  Balance  Sitting: GOOD: Maintains balance through MODERATE excursions of active trunk movement  Standing: FAIR+: Needs CLOSE SUPERVISION during gait and is able to right self with minor LOB  Stairs: Pt ascended/descended 4 stair(s) with No Assistive Device with right handrail with Contact Guard Assistance.     Therapeutic Activities and Exercises:  Patient educated on role of acute care PT and PT POC, safety while in hospital including calling nurse for mobility, and call light  usage.  Educated about weightbearing as chucky R LE and provided cuing for adherence as appropriate during session.  Educated about importance of OOB mobility and remaining up in chair most of the day.  PT reviewed ant hip precautions with pt and applications to mobility and he demonstrated good understanding back to PT.    PT reviewed and demonstrated car transfers with pt and caregiver and answered all questions.  Pt and caregiver verbalized good understanding back to PT.   Pt and his wife ed on home mobility expectations, safety in the home with use of RW and pet management and they verbalized good understanding back to PT.     AM-PAC 6 CLICK MOBILITY  Total Score:18    Patient left up in chair with all lines intact, call button in reach, RN notified, and spouse present.    GOALS:   Multidisciplinary Problems       Physical Therapy Goals       Not on file              Multidisciplinary Problems (Resolved)          Problem: Physical Therapy    Goal Priority Disciplines Outcome Goal Variances Interventions   Physical Therapy Goal   (Resolved)     PT, PT/OT Met     Description: Goals to be met by: 8/6/24    Pt met goals at [facility] to demonstrate safe level of mobility for d/c home with his wife to assist PRN and OPPT to being 8/8/24.       Patient demonstrates a mobility limitation that significantly impairs their ability to participate in one or more mobility related activities of daily living. Patient's mobility limitation cannot be sufficiently resolved with the use of a cane, but can be sufficiently resolved with the use of a rolling walker.The use of a rolling walker will considerably improve their ability to participate in MRADLs. Patient will use the walker on a regular basis at home.                             History:     Past Medical History:   Diagnosis Date    Anxiety     Arthritis     Personal history of colonic polyps 04/06/2023    Sleep apnea     Testicular failure        Past Surgical History:   Procedure  Laterality Date    COLONOSCOPY N/A 4/6/2023    Procedure: COLONOSCOPY;  Surgeon: Augusta Eubanks MD;  Location: [facility];  Service: Endoscopy;  Laterality: N/A;    INCISION AND DRAINAGE Left     hand    VASECTOMY         Time Tracking:     PT Received On: 08/06/24  PT Start Time: 1447  PT Stop Time: 1511  PT Total Time (min): 24 min     Billable Minutes: Evaluation 9 and Gait Training 15    8/6/2024

## 2024-08-06 NOTE — BRIEF OP NOTE
[facility] ([facility])  Brief Operative Note    Surgery Date: 8/6/2024     Surgeons and Role:     * Merlyn Patino MD - Primary    Assisting Surgeon: None    Pre-op Diagnosis:  Right hip pain [M25.551]    Post-op Diagnosis:  Post-Op Diagnosis Codes:     * Right hip pain [M25.551]    Procedure(s) (LRB):  ARTHROPLASTY, HIP (Right)    Anesthesia: General    Operative Findings: Right hip pain [M25.551]    Estimated Blood Loss: * No values recorded between 8/6/2024  7:58 AM and 8/6/2024 10:41 AM *         Specimens:   Specimen (24h ago, onward)      None              Discharge Note    OUTCOME: Patient tolerated treatment/procedure well without complication and is now ready for discharge.    DISPOSITION: Home or Self Care    FINAL DIAGNOSIS:  <principal problem not specified>    FOLLOWUP: In clinic    DISCHARGE INSTRUCTIONS:    Discharge Procedure Orders   CBC Auto Differential   Standing Status: Future Number of Occurrences: 1 Standing Exp. Date: 09/17/25     Comprehensive Metabolic Panel   Standing Status: Future Number of Occurrences: 1 Standing Exp. Date: 09/17/25     Protime-INR   Standing Status: Future Number of Occurrences: 1 Standing Exp. Date: 09/17/25     EKG 12-lead   Standing Status: Future Number of Occurrences: 1 Standing Exp. Date: 07/19/25

## 2024-08-06 NOTE — OPERATIVE NOTE ADDENDUM
Certification of Assistant at Surgery       Surgery Date: 8/6/2024     Participating Surgeons:  Surgeons and Role:     * Merlyn Patino MD - Primary    Procedures:  Procedure(s) (LRB):  ARTHROPLASTY, HIP (Right)    Assistant Surgeon's Certification of Necessity:  I understand that section 1842 (b) (6) (d) of the Social Security Act generally prohibits Medicare Part B reasonable charge payment for the services of assistants at surgery in teaching hospitals when qualified residents are available to furnish such services. I certify that the services for which payment is claimed were medically necessary, and that no qualified resident was available to perform the services. I further understand that these services are subject to post-payment review by the Medicare carrier.      Eleanor Marques MD    08/06/2024  11:10 AM

## 2024-08-06 NOTE — PLAN OF CARE
Preop complete. Pt states his walker is in the car. Resting comfortably. Wife at BS. Call light in reach, side rails up, bed in lowest position.

## 2024-08-07 ENCOUNTER — TELEPHONE (OUTPATIENT)
Dept: SPORTS MEDICINE | Facility: CLINIC | Age: 49
End: 2024-08-07

## 2024-08-07 ENCOUNTER — PATIENT MESSAGE (OUTPATIENT)
Dept: ADMINISTRATIVE | Facility: OTHER | Age: 49
End: 2024-08-07
Payer: COMMERCIAL

## 2024-08-07 ENCOUNTER — OFFICE VISIT (OUTPATIENT)
Dept: SPORTS MEDICINE | Facility: CLINIC | Age: 49
End: 2024-08-07
Payer: COMMERCIAL

## 2024-08-07 DIAGNOSIS — Z96.641 S/P HIP REPLACEMENT, RIGHT: Primary | ICD-10-CM

## 2024-08-07 PROCEDURE — 1159F MED LIST DOCD IN RCRD: CPT | Mod: CPTII,95,, | Performed by: PHYSICIAN ASSISTANT

## 2024-08-07 PROCEDURE — 99024 POSTOP FOLLOW-UP VISIT: CPT | Mod: 95,,, | Performed by: PHYSICIAN ASSISTANT

## 2024-08-07 PROCEDURE — 1160F RVW MEDS BY RX/DR IN RCRD: CPT | Mod: CPTII,95,, | Performed by: PHYSICIAN ASSISTANT

## 2024-08-08 ENCOUNTER — PATIENT MESSAGE (OUTPATIENT)
Dept: ADMINISTRATIVE | Facility: OTHER | Age: 49
End: 2024-08-08
Payer: COMMERCIAL

## 2024-08-08 ENCOUNTER — CLINICAL SUPPORT (OUTPATIENT)
Dept: REHABILITATION | Facility: HOSPITAL | Age: 49
End: 2024-08-08
Payer: COMMERCIAL

## 2024-08-08 DIAGNOSIS — R26.89 DECREASED FUNCTIONAL MOBILITY: Primary | ICD-10-CM

## 2024-08-08 DIAGNOSIS — M25.651 DECREASED RANGE OF RIGHT HIP MOVEMENT: ICD-10-CM

## 2024-08-08 DIAGNOSIS — Z96.641 S/P HIP REPLACEMENT, RIGHT: ICD-10-CM

## 2024-08-08 PROCEDURE — 97110 THERAPEUTIC EXERCISES: CPT

## 2024-08-08 PROCEDURE — 97162 PT EVAL MOD COMPLEX 30 MIN: CPT

## 2024-08-13 ENCOUNTER — CLINICAL SUPPORT (OUTPATIENT)
Dept: REHABILITATION | Facility: HOSPITAL | Age: 49
End: 2024-08-13
Payer: COMMERCIAL

## 2024-08-13 DIAGNOSIS — M25.651 DECREASED RANGE OF RIGHT HIP MOVEMENT: Primary | ICD-10-CM

## 2024-08-13 DIAGNOSIS — R26.89 DECREASED FUNCTIONAL MOBILITY: ICD-10-CM

## 2024-08-13 PROCEDURE — 97530 THERAPEUTIC ACTIVITIES: CPT

## 2024-08-13 PROCEDURE — 97112 NEUROMUSCULAR REEDUCATION: CPT

## 2024-08-13 NOTE — PROGRESS NOTES
Physical Therapy Daily Treatment Note     Name: Eliel Coffman  Clinic Number: [id number]    Therapy Diagnosis:   Encounter Diagnoses   Name Primary?    Decreased range of right hip movement Yes    Decreased functional mobility      Physician: Jackelin García PA-C    Visit Date: 2024    Physician Orders: PT Eval and Treat   Medical Diagnosis from Referral: Z96.641 (ICD-10-CM) - S/P hip replacement, right  Evaluation Date: 2024  Authorization Period Expiration: 2025  Plan of Care Expiration: 10/17/2024  Visit # / Visits authorized:      Time In: 11:00  Time Out: 11:40     Total Billable Time: 40 minutes     Precautions: Standard, post op  R MARCELINO ant   S/P R MARCELINO 2024 Anterior approach  2024-1 week post op    Subjective     Pt reports: Pt reports no pain, just tightness along incision site.  He was compliant with home exercise program.  Response to previous treatment: no adverse reactions  Functional change: none    Pain: 0/10  Location: right hip      Objective   Gait: R knee hyper extension during stance, antalgic gait, Rolling walker     Functional Tests:  TU.02 seconds  30 second STS 6 reps primarily using uninvolved LE and PRESTON UE support.          Hip Passive Range of Motion:2024 pre session    Right  Left    Flexion 55 90   Extension 0 0   Abduction 18 35   Int. Rotation NT NT      Knee Passive Range of Motion: NT     Ankle Passive Range of Motion:    Right  Left    Dorsiflexion WFL WFL      Lower Extremity Strength  -Not tested 2/2 to post op status           Eliel received therapeutic exercises to develop strength, endurance, ROM, flexibility, posture, and core stabilization for 0 minutes including:  Supine Heel slides (hip flexion/abduction)-pain free range 30x          Eliel received the following manual therapy techniques: Joint mobilizations, Manual traction, Myofacial release, Manual Lymphatic Drainage, Soft tissue Mobilization, and Friction Massage were applied to  the: hip for 0 minutes, including:      Eliel participated in neuromuscular re-education activities to improve: Balance, Coordination, Kinesthetic, Sense, Proprioception, and Posture for 17  minutes. The following activities were included:  Quad sets 5 sec holds 15x  Glute sets 5 sec holds 15x  Lateral weights shifts 30x      Eliel participated in dynamic functional therapeutic activities to improve functional performance for 23  minutes, including:  Mini sit to stands w/ 2 foam pads in chair 3x15  Nustep level 1 seat #23 10 min      Home Exercises Provided and Patient Education Provided     Education provided:   - Pt educated on HEP and POC    Written Home Exercises Provided: Patient instructed to cont prior HEP.  Exercises were reviewed and Eliel was able to demonstrate them prior to the end of the session.  Eliel demonstrated fair  understanding of the education provided.     See EMR under Patient Instructions for exercises provided prior visit.    Assessment     Pt presents with improvements in ROM since IE. Pt was educated he can progress to SPC for household ambulation and to monitor for increase in symptoms the following day. Added mini sit to stands, nustep, and heel raises today with good tolerance. Will continue to progress as tolerated.  Eliel Is progressing well towards his goals.   Pt prognosis is Good.     Pt will continue to benefit from skilled outpatient physical therapy to address the deficits listed in the problem list box on initial evaluation, provide pt/family education and to maximize pt's level of independence in the home and community environment.     Pt's spiritual, cultural and educational needs considered and pt agreeable to plan of care and goals.     Anticipated barriers to physical therapy: none    Goals:    Short Term Goals (4 Weeks):   1. Pts pain decreased 20 - 40% for improved functional mobility and quality of life  2. Pts PROM in hip increased by 15 degrees to enhance AROM and  functional mobility  3. Pts strength in the hip increased by 1/2 grade to facilitate improved active mobility and functional stability  4. Pt to exhibit correct return demonstration of exercises for self-management and independence with HEP  5. Pt to demonstrate enhanced neuromuscular response  with single leg static balance for improved functional mobility and gait pattern        Long Term Goals (8 Weeks):  1.  Pts pain level decreased to 75% or greater for with activity for restoring functional mobility and ADL.  2. Pts AROM in hip increased by 25 degrees to restore functional mobility and ADL  3. Pts strength in the LE increased by one grade to facilitate improved active mobility and functional stability  4. Pt will be independent with HEP for self-management.   5. Pt to exhibit dynamic stability on the RLE / LLE for stable functional mobility and gait.   6. Pt to demonstrate symmetrical weight bearing w/o pain to improve gait pattern with assistive device   7. Patient will improve FOTO score to </= see FOTO% limited to decrease perceived limitation with mobility.          Plan     Plan of care Certification: 8/8/2024 to 10/17/2024.     Outpatient Physical Therapy 1-2 times weekly for 8-10 weeks to include the following interventions: Gait Training, Manual Therapy, Moist Heat/ Ice, Neuromuscular Re-ed, Patient Education, Self Care, Therapeutic Activities, and Therapeutic Exercise.     Kashif Yeh, PT

## 2024-08-15 ENCOUNTER — CLINICAL SUPPORT (OUTPATIENT)
Dept: REHABILITATION | Facility: HOSPITAL | Age: 49
End: 2024-08-15
Payer: COMMERCIAL

## 2024-08-15 DIAGNOSIS — R26.89 DECREASED FUNCTIONAL MOBILITY: ICD-10-CM

## 2024-08-15 DIAGNOSIS — M25.651 DECREASED RANGE OF RIGHT HIP MOVEMENT: Primary | ICD-10-CM

## 2024-08-15 PROCEDURE — 97530 THERAPEUTIC ACTIVITIES: CPT

## 2024-08-15 PROCEDURE — 97112 NEUROMUSCULAR REEDUCATION: CPT

## 2024-08-15 PROCEDURE — 97110 THERAPEUTIC EXERCISES: CPT

## 2024-08-15 NOTE — PROGRESS NOTES
Physical Therapy Daily Treatment Note     Name: Eliel Coffman  Clinic Number: [id number]    Therapy Diagnosis:   Encounter Diagnoses   Name Primary?    Decreased range of right hip movement Yes    Decreased functional mobility        Physician: Jackelin García PA-C    Visit Date: 8/15/2024    Physician Orders: PT Eval and Treat   Medical Diagnosis from Referral: Z96.641 (ICD-10-CM) - S/P hip replacement, right  Evaluation Date: 2024  Authorization Period Expiration: 2025  Plan of Care Expiration: 10/17/2024  Visit # / Visits authorized:      Time In: 2:53  Time Out: 3:53     Total Billable Time: 60 minutes     Precautions: Standard, post op  R MARCELINO ant   S/P R MARCELINO 2024 Anterior approach  8/15/2024-1 week 2 days post op    Subjective     Pt reports: Pt reports no pain, just tightness along incision site.  He was compliant with home exercise program.  Response to previous treatment: no adverse reactions  Functional change: none    Pain: 0/10  Location: right hip      Objective   Gait: R knee hyper extension during stance, antalgic gait, Rolling walker     Functional Tests:  TU.02 seconds  30 second STS 6 reps primarily using uninvolved LE and PRESTON UE support.          Hip Passive Range of Motion:8/15/2024 pre session    Right  Left    Flexion 60 90   Extension 0 0   Abduction 26 35   Int. Rotation NT NT      Knee Passive Range of Motion: NT     Ankle Passive Range of Motion:    Right  Left    Dorsiflexion WFL WFL      Lower Extremity Strength  -Not tested 2/2 to post op status           Eliel received therapeutic exercises to develop strength, endurance, ROM, flexibility, posture, and core stabilization for 15 minutes including:  Objective assessment    Supine Heel slides (hip flexion/abduction)-pain free range 30x  Nustep level 1 seat #21 10 min        Eliel received the following manual therapy techniques: Joint mobilizations, Manual traction, Myofacial release, Manual Lymphatic Drainage,  Soft tissue Mobilization, and Friction Massage were applied to the: hip for 0 minutes, including:      Eliel participated in neuromuscular re-education activities to improve: Balance, Coordination, Kinesthetic, Sense, Proprioception, and Posture for 15  minutes. The following activities were included:  Quad sets 5 sec holds 15x  Glute sets 5 sec holds 15x    Supine hip ADD/ABD isometrics in neutral 5 sec holds 15x    Supine hip ER/IR isometrics in neutral hooklying position 5 sec holds 15x      Eliel participated in dynamic functional therapeutic activities to improve functional performance for 30  minutes, including:  Mini sit to stands w/ 2 foam pads in chair 3x15  Lateral weights shifts 30x  Staggered sagittal weight shifts PRESTON 30x    PT education: gait with SPC  Home Exercises Provided and Patient Education Provided     Education provided:   - Pt educated on HEP and POC    Written Home Exercises Provided: Patient instructed to cont prior HEP.  Exercises were reviewed and Eliel was able to demonstrate them prior to the end of the session.  Eliel demonstrated fair  understanding of the education provided.     See EMR under Patient Instructions for exercises provided prior visit.    Assessment     Pt presents with improvements in flexion/abduction ROM since IE. Progressed weight shifts and nustep today with good tolerance and significant muscle fatigue post session. Pt required mod verbal cues and visual feedback for compensatory weight shift during sit to stands. Added hip abduction/adduction/IR/ER isometrics in a neutral hip position for glute/hip activation with good tolerance. Will continue to progress as tolerated.  Eliel Is progressing well towards his goals.   Pt prognosis is Good.     Pt will continue to benefit from skilled outpatient physical therapy to address the deficits listed in the problem list box on initial evaluation, provide pt/family education and to maximize pt's level of independence in the  home and community environment.     Pt's spiritual, cultural and educational needs considered and pt agreeable to plan of care and goals.     Anticipated barriers to physical therapy: none    Goals:    Short Term Goals (4 Weeks):   1. Pts pain decreased 20 - 40% for improved functional mobility and quality of life  2. Pts PROM in hip increased by 15 degrees to enhance AROM and functional mobility  3. Pts strength in the hip increased by 1/2 grade to facilitate improved active mobility and functional stability  4. Pt to exhibit correct return demonstration of exercises for self-management and independence with HEP  5. Pt to demonstrate enhanced neuromuscular response  with single leg static balance for improved functional mobility and gait pattern        Long Term Goals (8 Weeks):  1.  Pts pain level decreased to 75% or greater for with activity for restoring functional mobility and ADL.  2. Pts AROM in hip increased by 25 degrees to restore functional mobility and ADL  3. Pts strength in the LE increased by one grade to facilitate improved active mobility and functional stability  4. Pt will be independent with HEP for self-management.   5. Pt to exhibit dynamic stability on the RLE / LLE for stable functional mobility and gait.   6. Pt to demonstrate symmetrical weight bearing w/o pain to improve gait pattern with assistive device   7. Patient will improve FOTO score to </= see FOTO% limited to decrease perceived limitation with mobility.          Plan     Plan of care Certification: 8/8/2024 to 10/17/2024.     Outpatient Physical Therapy 1-2 times weekly for 8-10 weeks to include the following interventions: Gait Training, Manual Therapy, Moist Heat/ Ice, Neuromuscular Re-ed, Patient Education, Self Care, Therapeutic Activities, and Therapeutic Exercise.     Kashif Yeh, PT

## 2024-08-16 ENCOUNTER — OFFICE VISIT (OUTPATIENT)
Dept: SPORTS MEDICINE | Facility: CLINIC | Age: 49
End: 2024-08-16
Payer: COMMERCIAL

## 2024-08-16 ENCOUNTER — HOSPITAL ENCOUNTER (OUTPATIENT)
Dept: RADIOLOGY | Facility: HOSPITAL | Age: 49
Discharge: HOME | End: 2024-08-16
Attending: PHYSICIAN ASSISTANT
Payer: COMMERCIAL

## 2024-08-16 VITALS
HEART RATE: 56 BPM | DIASTOLIC BLOOD PRESSURE: 85 MMHG | BODY MASS INDEX: 35 KG/M2 | HEIGHT: 71 IN | SYSTOLIC BLOOD PRESSURE: 142 MMHG | WEIGHT: 250 LBS

## 2024-08-16 DIAGNOSIS — Z96.641 S/P HIP REPLACEMENT, RIGHT: Primary | ICD-10-CM

## 2024-08-16 DIAGNOSIS — Z96.641 S/P HIP REPLACEMENT, RIGHT: ICD-10-CM

## 2024-08-16 PROCEDURE — 99999 PR PBB SHADOW E&M-EST. PATIENT-LVL IV: CPT | Mod: PBBFAC,,, | Performed by: PHYSICIAN ASSISTANT

## 2024-08-16 PROCEDURE — 73521 X-RAY EXAM HIPS BI 2 VIEWS: CPT | Mod: TC

## 2024-08-16 PROCEDURE — 73521 X-RAY EXAM HIPS BI 2 VIEWS: CPT | Mod: 26,,, | Performed by: RADIOLOGY

## 2024-08-16 NOTE — PROGRESS NOTES
Subjective:     Chief Complaint: Eliel Coffman is a 49 y.o. male who had concerns including Pain of the Right Hip.    Patient presents to clinic for 2 week post op evaluation of right hip. Pain today is 2/10. Denies nausea, vomiting, fever, chills, CP, and SOB. Uvaldohas been attending formal PT at [facility] 2x a week. He is no longer taking narcotics. He has been taking Tylenol as needed for pain. He has been taking ASA 325mg once daily for DVT prophylaxis. Doing well. Ambulating with a cane. Taking Celebrex 200mg BID, Lyrica 75mg BID, and Robaxin 500mg BID.      Date of Procedure: 8/6/2024   Procedure:   1. Right  Replacement, hip, acetabular and proximal femoral prosthesis (Total Hip arthroplasty), with or without autograft or allograft [id number]     Surgeons and Role:     * Merlyn Patino MD - Primary     Assisting Surgeon: None     Pre-Operative Diagnosis: Right hip pain [M25.551]     Post-Operative Diagnosis: Post-Op Diagnosis Codes:     * Right hip pain [M25.551]     Anesthesia: General     Operative Findings (including complications, if any): end-stage right hip arthritis      Pain  Associated symptoms include joint swelling. Pertinent negatives include no abdominal pain, chest pain, chills, congestion, coughing, fever, headaches, myalgias, nausea, numbness, rash, sore throat or vomiting.       Review of Systems   Constitutional: Negative. Negative for chills, fever, weight gain and weight loss.   HENT:  Negative for congestion and sore throat.    Eyes:  Negative for blurred vision and double vision.   Cardiovascular:  Negative for chest pain, leg swelling and palpitations.   Respiratory:  Negative for cough and shortness of breath.    Hematologic/Lymphatic: Does not bruise/bleed easily.   Skin:  Negative for itching, poor wound healing and rash.   Musculoskeletal:  Positive for joint pain and joint swelling. Negative for back pain, muscle weakness, myalgias and stiffness.   Gastrointestinal:  Negative for  abdominal pain, constipation, diarrhea, nausea and vomiting.   Genitourinary: Negative.  Negative for frequency and hematuria.   Neurological:  Negative for dizziness, headaches, numbness, paresthesias and sensory change.   Psychiatric/Behavioral:  Negative for altered mental status and depression. The patient is not nervous/anxious.    Allergic/Immunologic: Negative for hives.       Pain Related Questions  Over the past 3 days, what was your average pain during activity? (I.e. running, jogging, walking, climbing stairs, getting dressed, ect.): 2  Over the past 3 days, what was your highest pain level?: 2  Over the past 3 days, what was your lowest pain level? : 1    Other  How many nights a week are you awakened by your affected body part?: 0  Was the patient's HEIGHT measured or patient reported?: Measured  Was the patient's WEIGHT measured or patient reported?: Measured    Objective:     General: Eliel is well-developed, well-nourished, appears stated age, in no acute distress, alert and oriented to time, place and person.     General    Vitals reviewed.  Constitutional: He is oriented to person, place, and time. He appears well-developed and well-nourished. No distress.   Cardiovascular:  Normal rate and regular rhythm.            Pulmonary/Chest: Effort normal. No respiratory distress.   Neurological: He is alert and oriented to person, place, and time.   Psychiatric: He has a normal mood and affect. His behavior is normal. Thought content normal.           Right Knee Exam     Inspection   Erythema: absent  Scars: present  Swelling: present  Effusion: present  Deformity: absent  Bruising: absent    Tenderness   The patient is experiencing no tenderness.     Range of Motion   Extension:  0   Flexion:  90     Other   Sensation: normal    Comments:  Aquacel bandage removed. Incision healing well with no signs of infection or necrosis. No purulent drainage. NVI. Sutures tags trimmed.         Muscle Strength   Right  Lower Extremity   Hip Abduction: 5/5   Quadriceps:  4/5   Hamstrin/5     RADIOGRAPHS: 24  Bilateral hips:    FINDINGS:  Right hip arthroplasty identified.  The position alignment is satisfactory and unchanged as compared to the previous study.     Mild DJD involving the left hip.  No fracture or dislocation.  No bone destruction identified    Assessment:     Encounter Diagnosis   Name Primary?    S/P hip replacement, right Yes      Plan:     1. Reviewed operative note with patient.  2. Suture tags trimmed.  3. May shower now without covering incisions.  4. Continue  mg once a day.  5. Continue PT per protocol.  6. RTC in 4 weeks with Dr. Merlyn Patino for 6 week post op appt.  7. PHYSICAL THERAPY:   The patient should begin outpatient physical therapy on postop day # 1-3     While in the hospital the patient should be OOB to chair QID with Full on the operative leg with walker   or crutches and nursing or PT assistance; Ambulation should be with above weightbearing status  Full range of motion can be undertaken with full flexion as well as full internal and external  rotation as tolerated with anterior hip precautions.   The patient should use walker or crutches and advancing off as tolerated.         Patient questionnaires may have been collected.

## 2024-08-17 ENCOUNTER — PATIENT MESSAGE (OUTPATIENT)
Dept: SPORTS MEDICINE | Facility: CLINIC | Age: 49
End: 2024-08-17
Payer: COMMERCIAL

## 2024-08-20 ENCOUNTER — CLINICAL SUPPORT (OUTPATIENT)
Dept: REHABILITATION | Facility: HOSPITAL | Age: 49
End: 2024-08-20
Payer: COMMERCIAL

## 2024-08-20 DIAGNOSIS — M25.651 DECREASED RANGE OF RIGHT HIP MOVEMENT: Primary | ICD-10-CM

## 2024-08-20 DIAGNOSIS — R26.89 DECREASED FUNCTIONAL MOBILITY: ICD-10-CM

## 2024-08-20 PROCEDURE — 97112 NEUROMUSCULAR REEDUCATION: CPT

## 2024-08-20 PROCEDURE — 97530 THERAPEUTIC ACTIVITIES: CPT

## 2024-08-20 PROCEDURE — 97110 THERAPEUTIC EXERCISES: CPT

## 2024-08-20 NOTE — PROGRESS NOTES
Physical Therapy Daily Treatment Note     Name: Eliel Coffman  Clinic Number: [id number]    Therapy Diagnosis:   Encounter Diagnoses   Name Primary?    Decreased range of right hip movement Yes    Decreased functional mobility          Physician: Jackelin García PA-C    Visit Date: 2024    Physician Orders: PT Eval and Treat   Medical Diagnosis from Referral: Z96.641 (ICD-10-CM) - S/P hip replacement, right  Evaluation Date: 2024  Authorization Period Expiration: 2025  Plan of Care Expiration: 10/17/2024  Visit # / Visits authorized: 3/ 20     Time In: 9:00  Time Out: 10:00     Total Billable Time: 60 minutes     Precautions: Standard, post op  R MARCELINO ant   S/P R MARCELINO 2024 Anterior approach  2024-2 weeks post op    Subjective     Pt reports: Pt reports a tightness/discomfort around incision/posterior hip and also knee.   He was compliant with home exercise program.  Response to previous treatment: no adverse reactions  Functional change: none    Pain: 210  Location: right hip      Objective   Gait: R knee hyper extension during stance, antalgic gait, Rolling walker     Functional Tests:  TU.02 seconds  30 second STS 6 reps primarily using uninvolved LE and PRESTON UE support.          Hip Passive Range of Motion:8/15/2024 pre session    Right  Left    Flexion 66 90   Extension 0 0   Abduction 30 35   Int. Rotation NT NT      Knee Passive Range of Motion: NT     Ankle Passive Range of Motion:    Right  Left    Dorsiflexion WFL WFL      Lower Extremity Strength  -Not tested 2/2 to post op status           Eliel received therapeutic exercises to develop strength, endurance, ROM, flexibility, posture, and core stabilization for 8 minutes including:  Objective assessment    Supine Heel slides (hip flexion/abduction)-pain free range 30x  Nustep level 1 seat #21 10 min        Eliel received the following manual therapy techniques: Joint mobilizations, Manual traction, Myofacial release, Manual  Lymphatic Drainage, Soft tissue Mobilization, and Friction Massage were applied to the: hip for 7 minutes, including:  Hip PROM    Eliel participated in neuromuscular re-education activities to improve: Balance, Coordination, Kinesthetic, Sense, Proprioception, and Posture for 15  minutes. The following activities were included:  SL clamshells 3x10 2 sec hold  Mini bridges 3x8      Eliel participated in dynamic functional therapeutic activities to improve functional performance for 30  minutes, including:  Mini sit to stands w/ 1 foam pad in chair 3x15  Lateral weights shifts 30x  Staggered sagittal weight shifts PRESTON 30x  Staggered half cycle gait 30x PRESTON cueing for quad activation during gait    Gait training with SPC cueing for quad activation 4 laps  Home Exercises Provided and Patient Education Provided     Education provided:   - Pt educated on HEP and POC    Written Home Exercises Provided: Patient instructed to cont prior HEP.  Exercises were reviewed and Eliel was able to demonstrate them prior to the end of the session.  Eliel demonstrated fair  understanding of the education provided.     See EMR under Patient Instructions for exercises provided prior visit.    Assessment     Pt presents with improvements in flexion/abduction ROM since previous visit, but min increase in pain in knee from antalgic gait. Symptoms were abolished w/ gait after gait training. Pt required mod verbal cues for quad activation during RLE stance phase. Added clamshells and mini glute bridges today w/ fair tolerance and adhering to post op protocol. Progressed weight shifts and sit to stands with good tolerance and significant muscle fatigue post session. Pt required mod verbal cues and visual feedback for compensatory weight shift during sit to stands. Will continue to progress as tolerated.  Eliel Is progressing well towards his goals.   Pt prognosis is Good.     Pt will continue to benefit from skilled outpatient physical therapy  to address the deficits listed in the problem list box on initial evaluation, provide pt/family education and to maximize pt's level of independence in the home and community environment.     Pt's spiritual, cultural and educational needs considered and pt agreeable to plan of care and goals.     Anticipated barriers to physical therapy: none    Goals:    Short Term Goals (4 Weeks):   1. Pts pain decreased 20 - 40% for improved functional mobility and quality of life  2. Pts PROM in hip increased by 15 degrees to enhance AROM and functional mobility  3. Pts strength in the hip increased by 1/2 grade to facilitate improved active mobility and functional stability  4. Pt to exhibit correct return demonstration of exercises for self-management and independence with HEP  5. Pt to demonstrate enhanced neuromuscular response  with single leg static balance for improved functional mobility and gait pattern        Long Term Goals (8 Weeks):  1.  Pts pain level decreased to 75% or greater for with activity for restoring functional mobility and ADL.  2. Pts AROM in hip increased by 25 degrees to restore functional mobility and ADL  3. Pts strength in the LE increased by one grade to facilitate improved active mobility and functional stability  4. Pt will be independent with HEP for self-management.   5. Pt to exhibit dynamic stability on the RLE / LLE for stable functional mobility and gait.   6. Pt to demonstrate symmetrical weight bearing w/o pain to improve gait pattern with assistive device   7. Patient will improve FOTO score to </= see FOTO% limited to decrease perceived limitation with mobility.          Plan     Plan of care Certification: 8/8/2024 to 10/17/2024.     Outpatient Physical Therapy 1-2 times weekly for 8-10 weeks to include the following interventions: Gait Training, Manual Therapy, Moist Heat/ Ice, Neuromuscular Re-ed, Patient Education, Self Care, Therapeutic Activities, and Therapeutic Exercise.      Kashif Yeh, PT

## 2024-08-22 ENCOUNTER — CLINICAL SUPPORT (OUTPATIENT)
Dept: REHABILITATION | Facility: HOSPITAL | Age: 49
End: 2024-08-22
Payer: COMMERCIAL

## 2024-08-22 DIAGNOSIS — R26.89 DECREASED FUNCTIONAL MOBILITY: ICD-10-CM

## 2024-08-22 DIAGNOSIS — M25.651 DECREASED RANGE OF RIGHT HIP MOVEMENT: Primary | ICD-10-CM

## 2024-08-22 PROCEDURE — 97530 THERAPEUTIC ACTIVITIES: CPT | Mod: CQ

## 2024-08-22 PROCEDURE — 97112 NEUROMUSCULAR REEDUCATION: CPT | Mod: CQ

## 2024-08-22 NOTE — PROGRESS NOTES
Physical Therapy Daily Treatment Note     Name: Eliel Coffman  [facility] Number: [id number]    Therapy Diagnosis:   Encounter Diagnoses   Name Primary?    Decreased range of right hip movement Yes    Decreased functional mobility      Physician: Jackelin García PA-C    Visit Date: 8/22/2024    Physician Orders: PT Eval and Treat   Medical Diagnosis from Referral: Z96.641 (ICD-10-CM) - S/P hip replacement, right  Evaluation Date: 8/8/2024  Authorization Period Expiration: 8/7/2025  Plan of Care Expiration: 10/17/2024  Visit # / Visits authorized: 4 / 20 + eval     PTA Visit: 1 / 5    Time In: 1100  Time Out: 1200   Total Billable Time: 60 minutes (3 NMR, 1 TA)     Precautions: Standard, post op R MARCELINO ant    Subjective     Patient reports: he is having knee pain that he thinks is from compensation with walking. Continues to use the cane but is trying to walk around his house without it.   He was compliant with home exercise program.  Response to previous treatment: appropriate muscle response  Functional change: ongoing; ambulating with SPC    Pain: 2/10, currently  Location: Right hip      Objective     Objective Measures updated at progress report unless specified.   DOS 8/6/2024  Patient is 2 weeks, 2 days s/p Right MARCELINO (anterior approach) as of 8/22/2024.    Treatment     therapeutic exercises to develop strength, endurance, ROM, flexibility, posture, and core stabilization for 5 minutes including:  Supine Heel Slides (hip flexion/abduction) - pain free range, 30 reps    manual therapy techniques: Joint mobilizations, Manual traction, Myofacial release, Manual Lymphatic Drainage, Soft tissue Mobilization, and Friction Massage were applied to the: hip for 00 minutes, including:  Hip PROM    neuromuscular re-education activities to improve: Balance, Coordination, Kinesthetic, Sense, Proprioception, and Posture for 47 minutes. The following activities were included:  Nustep for gait mechanics; 10 minutes, Level  1  SL clamshells; 3 x 10 reps, 5 second hold Bilateral  Mini bridges in HL; 10 second hold, 3 x 10 reps  Standing TKE + Black TB; 10 second hold, 2 x 15 reps  LAQs at EOM + 5 lb AW; 10 second hold, 30 reps RLE    therapeutic activities to improve functional performance for 8 minutes, including:  Mini sit to stands + 1 foam pad in chair; 3 x 15 reps    Held:  Lateral weights shifts 30x  Staggered sagittal weight shifts PRESTON 30x  Staggered half cycle gait 30x PRESTON cueing for quad activation during gait  Gait training with SPC cueing for quad activation 4 laps    Home Exercises Provided and Patient Education Provided     Education provided:   - Pt educated on HEP and POC    Written Home Exercises Provided: Patient instructed to cont prior HEP.  Exercises were reviewed and Eliel was able to demonstrate them prior to the end of the session.  Eliel demonstrated fair  understanding of the education provided.     See EMR under Patient Instructions for exercises provided prior visit.    Assessment     Eliel is 2 weeks, 2 days s/p Right MARCELINO. Overall good tolerance to therapeutic interventions noting adequate muscle response throughout. Added standing resisted TKE's and long arc quads today to improve quadriceps strength and motor control. Notes increased fatigue with this. Will continue per POC towards treatment goals.  Eliel Is progressing well towards his goals.   Pt prognosis is Good.     Pt will continue to benefit from skilled outpatient physical therapy to address the deficits listed in the problem list box on initial evaluation, provide pt/family education and to maximize pt's level of independence in the home and community environment.     Pt's spiritual, cultural and educational needs considered and pt agreeable to plan of care and goals.     Anticipated barriers to physical therapy: none    Goals:    Short Term Goals (4 Weeks): - Appropriate, ongoing  1. Pts pain decreased 20 - 40% for improved functional mobility and  quality of life  2. Pts PROM in hip increased by 15 degrees to enhance AROM and functional mobility  3. Pts strength in the hip increased by 1/2 grade to facilitate improved active mobility and functional stability  4. Pt to exhibit correct return demonstration of exercises for self-management and independence with HEP  5. Pt to demonstrate enhanced neuromuscular response  with single leg static balance for improved functional mobility and gait pattern     Long Term Goals (8 Weeks): - Appropriate, ongoing  1.  Pts pain level decreased to 75% or greater for with activity for restoring functional mobility and ADL.  2. Pts AROM in hip increased by 25 degrees to restore functional mobility and ADL  3. Pts strength in the LE increased by one grade to facilitate improved active mobility and functional stability  4. Pt will be independent with HEP for self-management.   5. Pt to exhibit dynamic stability on the RLE / LLE for stable functional mobility and gait.   6. Pt to demonstrate symmetrical weight bearing w/o pain to improve gait pattern with assistive device   7. Patient will improve FOTO score to </= see FOTO% limited to decrease perceived limitation with mobility.     Plan     Plan of Care Certification: 8/8/2024 to 10/17/2024.     Outpatient Physical Therapy 1-2 times weekly for 8-10 weeks to include the following interventions: Gait Training, Manual Therapy, Moist Heat/ Ice, Neuromuscular Re-ed, Patient Education, Self Care, Therapeutic Activities, and Therapeutic Exercise.     Reyna Paris, PTA

## 2024-08-26 ENCOUNTER — PATIENT MESSAGE (OUTPATIENT)
Dept: SPORTS MEDICINE | Facility: CLINIC | Age: 49
End: 2024-08-26
Payer: COMMERCIAL

## 2024-08-27 ENCOUNTER — CLINICAL SUPPORT (OUTPATIENT)
Dept: REHABILITATION | Facility: HOSPITAL | Age: 49
End: 2024-08-27
Payer: COMMERCIAL

## 2024-08-27 DIAGNOSIS — M25.651 DECREASED RANGE OF RIGHT HIP MOVEMENT: Primary | ICD-10-CM

## 2024-08-27 DIAGNOSIS — R26.89 DECREASED FUNCTIONAL MOBILITY: ICD-10-CM

## 2024-08-27 PROCEDURE — 97530 THERAPEUTIC ACTIVITIES: CPT | Mod: CQ

## 2024-08-27 PROCEDURE — 97112 NEUROMUSCULAR REEDUCATION: CPT | Mod: CQ

## 2024-08-27 NOTE — PROGRESS NOTES
Physical Therapy Daily Treatment Note     Name: Eliel Coffman  Clinic Number: [id number]    Therapy Diagnosis:   Encounter Diagnoses   Name Primary?    Decreased range of right hip movement Yes    Decreased functional mobility      Physician: Jackelin García PA-C    Visit Date: 8/27/2024    Physician Orders: PT Eval and Treat   Medical Diagnosis from Referral: Z96.641 (ICD-10-CM) - S/P hip replacement, right  Evaluation Date: 8/8/2024  Authorization Period Expiration: 8/7/2025  Plan of Care Expiration: 10/17/2024  Visit # / Visits authorized: 5 / 20 + eval     PTA Visit: 2 / 5    Time In: 1050  Time Out: 1155  Total Billable Time: 65 minutes (3 NMR, 2 TA)     Precautions: Standard, post op R MARCELINO ant    Subjective     Patient reports: he feels stretching in his butt when he bends down or puts on his shoes. He denies pain. Felt sore after previous session.   He was compliant with home exercise program.  Response to previous treatment: appropriate muscle response  Functional change: ongoing; ambulating with SPC    Pain: 2/10, currently  Location: Right hip      Objective     Objective Measures updated at progress report unless specified.   DOS 8/6/2024  Patient is 3 weeks, 0 days s/p Right MARCELINO (anterior approach) as of 8/27/2024.    Treatment     therapeutic exercises to develop strength, endurance, ROM, flexibility, posture, and core stabilization for 00 minutes including:  Supine Heel Slides (hip flexion/abduction) - pain free range, 30 reps    manual therapy techniques: Joint mobilizations, Manual traction, Myofacial release, Manual Lymphatic Drainage, Soft tissue Mobilization, and Friction Massage were applied to the: hip for 00 minutes, including:  Hip PROM    neuromuscular re-education activities to improve: Balance, Coordination, Kinesthetic, Sense, Proprioception, and Posture for 42 minutes. The following activities were included:  Nustep for gait mechanics; 10 minutes, Level 5  SL clamshells; 3 x 10 reps,  5 second hold Bilateral  Mini bridges in HL; 10 second hold, 3 x 10 reps  Standing TKE + Black TB; 10 second hold, 2 x 15 reps  LAQs at EOM + 5 lb AW; 10 second hold, 30 reps RLE  Gait training in // bars; 10 minutes    therapeutic activities to improve functional performance for 23 minutes, including:  Mini sit to stands + 1 foam pad in chair; 3 x 15 reps  Shuttle DL Press; 62.5 lbs, 3 x 10 reps   Step ups onto 6-inch step with opp LE march; 3 x 10 reps  Matrix knee extension; 15 lbs with 5 second hold time, 2 x 8 reps     D/C to HEP:  Lateral weights shifts 30x  Staggered sagittal weight shifts PRESTON 30x  Staggered half cycle gait 30x PRESTON cueing for quad activation during gait  Gait training with SPC cueing for quad activation 4 laps    Home Exercises Provided and Patient Education Provided     Education provided:   - Pt educated on HEP and POC    Written Home Exercises Provided: Patient instructed to cont prior HEP.  Exercises were reviewed and Eliel was able to demonstrate them prior to the end of the session.  Eliel demonstrated fair  understanding of the education provided.     See EMR under Patient Instructions for exercises provided prior visit.    Assessment     Eliel is 3 weeks, 0 days s/p Right MARCELINO. Addition of Shuttle double leg press and step ups onto 6-inch step today to improve functional tolerance to ADL's. Overall good tolerance to therapeutic interventions noting adequate muscle response throughout. Gait training performed in parallel bars with visible difficulty maintaining quad activation during stance phase and weight bearing. There is visible knee buckling. Session targeted quad strengthening and motor control in addition to hip strengthening. Will continue per POC towards treatment goals.  Eliel Is progressing well towards his goals.   Pt prognosis is Good.     Pt will continue to benefit from skilled outpatient physical therapy to address the deficits listed in the problem list box on initial  evaluation, provide pt/family education and to maximize pt's level of independence in the home and community environment.     Pt's spiritual, cultural and educational needs considered and pt agreeable to plan of care and goals.     Anticipated barriers to physical therapy: none    Goals:    Short Term Goals (4 Weeks): - Appropriate, ongoing  1. Pts pain decreased 20 - 40% for improved functional mobility and quality of life  2. Pts PROM in hip increased by 15 degrees to enhance AROM and functional mobility  3. Pts strength in the hip increased by 1/2 grade to facilitate improved active mobility and functional stability  4. Pt to exhibit correct return demonstration of exercises for self-management and independence with HEP  5. Pt to demonstrate enhanced neuromuscular response  with single leg static balance for improved functional mobility and gait pattern     Long Term Goals (8 Weeks): - Appropriate, ongoing  1.  Pts pain level decreased to 75% or greater for with activity for restoring functional mobility and ADL.  2. Pts AROM in hip increased by 25 degrees to restore functional mobility and ADL  3. Pts strength in the LE increased by one grade to facilitate improved active mobility and functional stability  4. Pt will be independent with HEP for self-management.   5. Pt to exhibit dynamic stability on the RLE / LLE for stable functional mobility and gait.   6. Pt to demonstrate symmetrical weight bearing w/o pain to improve gait pattern with assistive device   7. Patient will improve FOTO score to </= see FOTO% limited to decrease perceived limitation with mobility.     Plan     Plan of Care Certification: 8/8/2024 to 10/17/2024.     Outpatient Physical Therapy 1-2 times weekly for 8-10 weeks to include the following interventions: Gait Training, Manual Therapy, Moist Heat/ Ice, Neuromuscular Re-ed, Patient Education, Self Care, Therapeutic Activities, and Therapeutic Exercise.     Reyna Paris, PTA

## 2024-08-29 ENCOUNTER — CLINICAL SUPPORT (OUTPATIENT)
Dept: REHABILITATION | Facility: HOSPITAL | Age: 49
End: 2024-08-29
Payer: COMMERCIAL

## 2024-08-29 DIAGNOSIS — R26.89 DECREASED FUNCTIONAL MOBILITY: ICD-10-CM

## 2024-08-29 DIAGNOSIS — M25.651 DECREASED RANGE OF RIGHT HIP MOVEMENT: Primary | ICD-10-CM

## 2024-08-29 PROCEDURE — 97112 NEUROMUSCULAR REEDUCATION: CPT

## 2024-08-29 PROCEDURE — 97530 THERAPEUTIC ACTIVITIES: CPT

## 2024-08-31 NOTE — PROGRESS NOTES
Physical Therapy Daily Treatment Note     Name: Eliel Coffman  [facility] Number: [id number]    Therapy Diagnosis:   Encounter Diagnoses   Name Primary?    Decreased range of right hip movement Yes    Decreased functional mobility      Physician: Jackelin García PA-C    Visit Date: 8/29/2024    Physician Orders: PT Eval and Treat   Medical Diagnosis from Referral: Z96.641 (ICD-10-CM) - S/P hip replacement, right  Evaluation Date: 8/8/2024  Authorization Period Expiration: 8/7/2025  Plan of Care Expiration: 10/17/2024  Visit # / Visits authorized: 6 / 20 + eval     PTA Visit: 2 / 5    Time In: 9:00 AM  Time Out: 10:01 AM  Total Billable Time: 61 minutes      Precautions: Standard, post op R MARCELINO ant    Subjective     Patient reports: he is not having any hip pain but just feels like his knee and quad are really weak.   He was compliant with home exercise program.  Response to previous treatment: appropriate muscle response  Functional change: ongoing; ambulating without assisive device     Pain: 2/10, currently  Location: Right hip      Objective     Objective Measures updated at progress report unless specified.   DOS 8/6/2024  Patient is 3 weeks, 2 days s/p Right MARCELINO (anterior approach) as of 8/29/2024.    Treatment     therapeutic exercises to develop strength, endurance, ROM, flexibility, posture, and core stabilization for 00 minutes including:    manual therapy techniques: Joint mobilizations, Manual traction, Myofacial release, Manual Lymphatic Drainage, Soft tissue Mobilization, and Friction Massage were applied to the: hip for 00 minutes, including:    neuromuscular re-education activities to improve: Balance, Coordination, Kinesthetic, Sense, Proprioception, and Posture for 38 minutes. The following activities were included:  Nustep for gait mechanics; 10 minutes, Level 5  Seated quad iso's 50%/75%/100% 10 sec hold x 10 reps each  Lateral band walks GTB 5 laps  Standing hip abduction 3 x 10 bilateral      therapeutic activities to improve functional performance for 23 minutes, including:  Mini sit to stands + 1 foam pad in chair; 3 x 15 reps  Shuttle DL Press; 62.5 lbs 10 sec hold x 20 reps  Beck carry 8#kb with runner's hold    Home Exercises Provided and Patient Education Provided     Education provided:   - Pt educated on HEP and POC    Written Home Exercises Provided: Patient instructed to cont prior HEP.  Exercises were reviewed and Eliel was able to demonstrate them prior to the end of the session.  Eliel demonstrated fair  understanding of the education provided.     See EMR under Patient Instructions for exercises provided prior visit.    Assessment     Eliel is 3 weeks, 2 days s/p Right MARCELINO. Interventions adjusted to address PFPS symptoms with edge of mat iso at sub max holds and with shuttle iso with sub max holds. Significantly poor quadricep strength noted throughout session with notable fatigue requiring 1-2 minute rest breaks between sets/reps. Progressed with frontal plane movement this date which she tolerated well. Will continue to progress as tolerated.     Eliel Is progressing well towards his goals.   Pt prognosis is Good.     Pt will continue to benefit from skilled outpatient physical therapy to address the deficits listed in the problem list box on initial evaluation, provide pt/family education and to maximize pt's level of independence in the home and community environment.     Pt's spiritual, cultural and educational needs considered and pt agreeable to plan of care and goals.     Anticipated barriers to physical therapy: none    Goals:    Short Term Goals (4 Weeks): - Appropriate, ongoing  1. Pts pain decreased 20 - 40% for improved functional mobility and quality of life  2. Pts PROM in hip increased by 15 degrees to enhance AROM and functional mobility  3. Pts strength in the hip increased by 1/2 grade to facilitate improved active mobility and functional stability  4. Pt to exhibit  correct return demonstration of exercises for self-management and independence with HEP  5. Pt to demonstrate enhanced neuromuscular response  with single leg static balance for improved functional mobility and gait pattern     Long Term Goals (8 Weeks): - Appropriate, ongoing  1.  Pts pain level decreased to 75% or greater for with activity for restoring functional mobility and ADL.  2. Pts AROM in hip increased by 25 degrees to restore functional mobility and ADL  3. Pts strength in the LE increased by one grade to facilitate improved active mobility and functional stability  4. Pt will be independent with HEP for self-management.   5. Pt to exhibit dynamic stability on the RLE / LLE for stable functional mobility and gait.   6. Pt to demonstrate symmetrical weight bearing w/o pain to improve gait pattern with assistive device   7. Patient will improve FOTO score to </= see FOTO% limited to decrease perceived limitation with mobility.     Plan     Plan of Care Certification: 8/8/2024 to 10/17/2024.     Outpatient Physical Therapy 1-2 times weekly for 8-10 weeks to include the following interventions: Gait Training, Manual Therapy, Moist Heat/ Ice, Neuromuscular Re-ed, Patient Education, Self Care, Therapeutic Activities, and Therapeutic Exercise.     Lulu Whitaker, PT

## 2024-09-03 ENCOUNTER — CLINICAL SUPPORT (OUTPATIENT)
Dept: REHABILITATION | Facility: HOSPITAL | Age: 49
End: 2024-09-03
Payer: COMMERCIAL

## 2024-09-03 ENCOUNTER — PATIENT MESSAGE (OUTPATIENT)
Dept: SPORTS MEDICINE | Facility: CLINIC | Age: 49
End: 2024-09-03
Payer: COMMERCIAL

## 2024-09-03 ENCOUNTER — TELEPHONE (OUTPATIENT)
Dept: SPORTS MEDICINE | Facility: HOSPITAL | Age: 49
End: 2024-09-03
Payer: COMMERCIAL

## 2024-09-03 DIAGNOSIS — R26.89 DECREASED FUNCTIONAL MOBILITY: ICD-10-CM

## 2024-09-03 DIAGNOSIS — M25.651 DECREASED RANGE OF RIGHT HIP MOVEMENT: Primary | ICD-10-CM

## 2024-09-03 PROCEDURE — 97112 NEUROMUSCULAR REEDUCATION: CPT

## 2024-09-03 PROCEDURE — 97530 THERAPEUTIC ACTIVITIES: CPT

## 2024-09-05 ENCOUNTER — CLINICAL SUPPORT (OUTPATIENT)
Dept: REHABILITATION | Facility: HOSPITAL | Age: 49
End: 2024-09-05
Payer: COMMERCIAL

## 2024-09-05 DIAGNOSIS — M25.651 DECREASED RANGE OF RIGHT HIP MOVEMENT: Primary | ICD-10-CM

## 2024-09-05 DIAGNOSIS — R26.89 DECREASED FUNCTIONAL MOBILITY: ICD-10-CM

## 2024-09-05 DIAGNOSIS — M16.11 PRIMARY OSTEOARTHRITIS OF RIGHT HIP: ICD-10-CM

## 2024-09-05 PROCEDURE — 97112 NEUROMUSCULAR REEDUCATION: CPT | Mod: CQ

## 2024-09-05 PROCEDURE — 97530 THERAPEUTIC ACTIVITIES: CPT | Mod: CQ

## 2024-09-05 NOTE — PROGRESS NOTES
Physical Therapy Daily Treatment Note     Name: Eliel Coffman  Clinic Number: [id number]    Therapy Diagnosis:   Encounter Diagnoses   Name Primary?    Decreased range of right hip movement Yes    Decreased functional mobility      Physician: Jackelin García PA-C    Visit Date: 9/5/2024    Physician Orders: PT Eval and Treat   Medical Diagnosis from Referral: Z96.641 (ICD-10-CM) - S/P hip replacement, right  Evaluation Date: 8/8/2024  Authorization Period Expiration: 8/7/2025  Plan of Care Expiration: 10/17/2024  Visit # / Visits authorized: 8 / 20 + eval     PTA Visit: 1 / 5    Time In: 0905  Time Out: 1006  Total Billable Time: 61 minutes (3 TA, 2 NMR)     Precautions: Standard, post op R MARCELINO ant    Subjective     Patient reports: he was sore after last visit. Presents today without his cane. Knee pain seems better but will still buckle at times.   He was compliant with home exercise program.  Response to previous treatment: appropriate muscle response  Functional change: ongoing; ambulating without assisive device     Pain: 2/10, currently  Location: Right hip      Objective     Objective Measures updated at progress report unless specified.   DOS 8/6/2024  Patient is 4 weeks, 2 days s/p Right MARCELINO (anterior approach) as of 9/5/2024.    Treatment     therapeutic exercises to develop strength, endurance, ROM, flexibility, posture, and core stabilization for 00 minutes including:    manual therapy techniques: Joint mobilizations, Manual traction, Myofacial release, Manual Lymphatic Drainage, Soft tissue Mobilization, and Friction Massage were applied to the: hip for 00 minutes, including:    neuromuscular re-education activities to improve: Balance, Coordination, Kinesthetic, Sense, Proprioception, and Posture for 23 minutes. The following activities were included:  Recumbent Bike for gait mechanics; 10 minutes, Level 5-7  Lateral band walks at half wall (16 feet) + Green PB; 5 laps  Standing hip abduction +  "Yellow PB band; 3 x 10 reps Bilateral     therapeutic activities to improve functional performance for 38 minutes, including:   Front squat with std chair + 10 lb bar; 3 x 10 reps  Lateral kevin taps (9-inch kevin); 3 x 10 reps Bilateral  Shuttle DL Press; 75 lbs, 10 second hold x 20 reps  Resisted hip flexor marches; Yellow PB at feet, 10 second hold, 20 reps each LE  Beck carry with 12 lb KB with runner's hold; 5 second hold, 5 laps at half wall (16 feet)  Attempted 4" lateral step up - unable to perform    Home Exercises Provided and Patient Education Provided     Education provided:   - Pt educated on HEP and POC    Written Home Exercises Provided: Patient instructed to cont prior HEP.  Exercises were reviewed and Eliel was able to demonstrate them prior to the end of the session.  Eliel demonstrated fair  understanding of the education provided.     See EMR under Patient Instructions for exercises provided prior visit.    Assessment     Eliel is 4 weeks, 2 days s/p Right MARCELINO. Overall good tolerance to therapeutic interventions noting adequate muscle response throughout. Session emphasized functional strengthening. Addition of resisted marches today to target hip flexors for improved tolerance to ADL's and functional mobility. Will continue per POC towards treatment goals.  Eliel Is progressing well towards his goals.   Pt prognosis is Good.     Pt will continue to benefit from skilled outpatient physical therapy to address the deficits listed in the problem list box on initial evaluation, provide pt/family education and to maximize pt's level of independence in the home and community environment.     Pt's spiritual, cultural and educational needs considered and pt agreeable to plan of care and goals.     Anticipated barriers to physical therapy: none    Goals:    Short Term Goals (4 Weeks): - Appropriate, ongoing  1. Pts pain decreased 20 - 40% for improved functional mobility and quality of life  2. Pts " PROM in hip increased by 15 degrees to enhance AROM and functional mobility  3. Pts strength in the hip increased by 1/2 grade to facilitate improved active mobility and functional stability  4. Pt to exhibit correct return demonstration of exercises for self-management and independence with HEP  5. Pt to demonstrate enhanced neuromuscular response  with single leg static balance for improved functional mobility and gait pattern     Long Term Goals (8 Weeks): - Appropriate, ongoing  1.  Pts pain level decreased to 75% or greater for with activity for restoring functional mobility and ADL.  2. Pts AROM in hip increased by 25 degrees to restore functional mobility and ADL  3. Pts strength in the LE increased by one grade to facilitate improved active mobility and functional stability  4. Pt will be independent with HEP for self-management.   5. Pt to exhibit dynamic stability on the RLE / LLE for stable functional mobility and gait.   6. Pt to demonstrate symmetrical weight bearing w/o pain to improve gait pattern with assistive device   7. Patient will improve FOTO score to </= see FOTO% limited to decrease perceived limitation with mobility.     Plan     Plan of Care Certification: 8/8/2024 to 10/17/2024.     Outpatient Physical Therapy 1-2 times weekly for 8-10 weeks to include the following interventions: Gait Training, Manual Therapy, Moist Heat/ Ice, Neuromuscular Re-ed, Patient Education, Self Care, Therapeutic Activities, and Therapeutic Exercise.     Reyna Paris PTA

## 2024-09-05 NOTE — PROGRESS NOTES
Physical Therapy Daily Treatment Note     Name: Eliel Coffman  [facility] Number: [id number]    Therapy Diagnosis:   Encounter Diagnoses   Name Primary?    Decreased range of right hip movement Yes    Decreased functional mobility      Physician: Jackelin García PA-C    Visit Date: 9/3/2024    Physician Orders: PT Eval and Treat   Medical Diagnosis from Referral: Z96.641 (ICD-10-CM) - S/P hip replacement, right  Evaluation Date: 8/8/2024  Authorization Period Expiration: 8/7/2025  Plan of Care Expiration: 10/17/2024  Visit # / Visits authorized: 7 / 20 + eval     PTA Visit: 2 / 5    Time In: 2:00 PM  Time Out: 3:01 PM  Total Billable Time: 61 minutes      Precautions: Standard, post op R MARCELINO ant    Subjective     Patient reports: that he has been going to the gym to do some exercises and can really tell how weak he is. Was a little sore after last session.    He was compliant with home exercise program.  Response to previous treatment: appropriate muscle response  Functional change: ongoing; ambulating without assisive device     Pain: 2/10, currently  Location: Right hip      Objective     Objective Measures updated at progress report unless specified.   DOS 8/6/2024  Patient is 4 weeks, 0 days s/p Right MARCELINO (anterior approach) as of 9/3/2024.    Treatment     therapeutic exercises to develop strength, endurance, ROM, flexibility, posture, and core stabilization for 00 minutes including:    manual therapy techniques: Joint mobilizations, Manual traction, Myofacial release, Manual Lymphatic Drainage, Soft tissue Mobilization, and Friction Massage were applied to the: hip for 00 minutes, including:    neuromuscular re-education activities to improve: Balance, Coordination, Kinesthetic, Sense, Proprioception, and Posture for 23 minutes. The following activities were included:  Nustep for gait mechanics; 10 minutes, Level 5 - could progress to recumbent bike  Lateral band walks GTB 5 laps  Standing hip abduction 3 x 10  "bilateral     therapeutic activities to improve functional performance for 38 minutes, including:  Sit to stands + 1 airex pad 1 x 10   Front squat (yellow bar) 2 x 10   Front squat (10# bar) 2 x 10   Lateral kevin taps (12" step) 3 x 10 bilateral - could reduce to 9" if discomfort next visit  Shuttle DL Press; 75 lbs 10 sec hold x 20 reps  Beck carry 12#kb with runner's hold 5 laps  Attempted 4" lateral step up - unable to perform    Home Exercises Provided and Patient Education Provided     Education provided:   - Pt educated on HEP and POC    Written Home Exercises Provided: Patient instructed to cont prior HEP.  Exercises were reviewed and Eliel was able to demonstrate them prior to the end of the session.  Eliel demonstrated fair  understanding of the education provided.     See EMR under Patient Instructions for exercises provided prior visit.    Assessment     Eliel is 4 weeks, 0 days s/p Right MARCELINO. Continued with gross lower extremity strength through functional positions such as squats. Squats were modified to 1 airex pad to cue for correct mechanics and then he was able to perform without modification. Progressed with front squats with/out weighted and unweighted bar. Minimal cueing required for correction and noted increased glut activation after squats noting proper mechanics were performed. Continued with frontal plane movement including standing hip abduction with resistance, lateral band walks, and standing kevin taps which also addressed weak hip flexors as noted by fatigue post session. Attempted 4" lateral step downs but unable to perform at this time due to poor eccentric quadriceps and proximal hip control. Will continue with current plan and progress as tolerated.     Eliel Is progressing well towards his goals.   Pt prognosis is Good.     Pt will continue to benefit from skilled outpatient physical therapy to address the deficits listed in the problem list box on initial evaluation, provide " pt/family education and to maximize pt's level of independence in the home and community environment.     Pt's spiritual, cultural and educational needs considered and pt agreeable to plan of care and goals.     Anticipated barriers to physical therapy: none    Goals:    Short Term Goals (4 Weeks): - Appropriate, ongoing  1. Pts pain decreased 20 - 40% for improved functional mobility and quality of life  2. Pts PROM in hip increased by 15 degrees to enhance AROM and functional mobility  3. Pts strength in the hip increased by 1/2 grade to facilitate improved active mobility and functional stability  4. Pt to exhibit correct return demonstration of exercises for self-management and independence with HEP  5. Pt to demonstrate enhanced neuromuscular response  with single leg static balance for improved functional mobility and gait pattern     Long Term Goals (8 Weeks): - Appropriate, ongoing  1.  Pts pain level decreased to 75% or greater for with activity for restoring functional mobility and ADL.  2. Pts AROM in hip increased by 25 degrees to restore functional mobility and ADL  3. Pts strength in the LE increased by one grade to facilitate improved active mobility and functional stability  4. Pt will be independent with HEP for self-management.   5. Pt to exhibit dynamic stability on the RLE / LLE for stable functional mobility and gait.   6. Pt to demonstrate symmetrical weight bearing w/o pain to improve gait pattern with assistive device   7. Patient will improve FOTO score to </= see FOTO% limited to decrease perceived limitation with mobility.     Plan     Plan of Care Certification: 8/8/2024 to 10/17/2024.     Outpatient Physical Therapy 1-2 times weekly for 8-10 weeks to include the following interventions: Gait Training, Manual Therapy, Moist Heat/ Ice, Neuromuscular Re-ed, Patient Education, Self Care, Therapeutic Activities, and Therapeutic Exercise.     Lulu Whitaker, PT, DPT

## 2024-09-06 RX ORDER — ASPIRIN 325 MG
325 TABLET, DELAYED RELEASE (ENTERIC COATED) ORAL DAILY
Qty: 42 TABLET | Refills: 0 | Status: SHIPPED | OUTPATIENT
Start: 2024-09-06 | End: 2024-10-18

## 2024-09-09 ENCOUNTER — OFFICE VISIT (OUTPATIENT)
Dept: URGENT CARE | Facility: CLINIC | Age: 49
End: 2024-09-09
Payer: COMMERCIAL

## 2024-09-09 VITALS
DIASTOLIC BLOOD PRESSURE: 80 MMHG | HEART RATE: 77 BPM | BODY MASS INDEX: 34.87 KG/M2 | WEIGHT: 250 LBS | SYSTOLIC BLOOD PRESSURE: 131 MMHG | OXYGEN SATURATION: 97 % | RESPIRATION RATE: 16 BRPM | TEMPERATURE: 100 F

## 2024-09-09 DIAGNOSIS — U07.1 COVID-19 VIRUS INFECTION: Primary | ICD-10-CM

## 2024-09-09 DIAGNOSIS — J06.9 VIRAL UPPER RESPIRATORY TRACT INFECTION WITH COUGH: ICD-10-CM

## 2024-09-09 DIAGNOSIS — U07.1 COVID-19 VIRUS DETECTED: ICD-10-CM

## 2024-09-09 LAB
CTP QC/QA: YES
CTP QC/QA: YES
POC MOLECULAR INFLUENZA A AGN: NEGATIVE
POC MOLECULAR INFLUENZA B AGN: NEGATIVE
SARS-COV-2 AG RESP QL IA.RAPID: POSITIVE

## 2024-09-09 PROCEDURE — 99214 OFFICE O/P EST MOD 30 MIN: CPT | Mod: 25,S$GLB,, | Performed by: PHYSICIAN ASSISTANT

## 2024-09-09 PROCEDURE — 96372 THER/PROPH/DIAG INJ SC/IM: CPT | Mod: S$GLB,,, | Performed by: PHYSICIAN ASSISTANT

## 2024-09-09 PROCEDURE — 87502 INFLUENZA DNA AMP PROBE: CPT | Mod: QW,S$GLB,, | Performed by: PHYSICIAN ASSISTANT

## 2024-09-09 PROCEDURE — 87811 SARS-COV-2 COVID19 W/OPTIC: CPT | Mod: QW,S$GLB,, | Performed by: PHYSICIAN ASSISTANT

## 2024-09-09 RX ORDER — DEXAMETHASONE SODIUM PHOSPHATE 10 MG/ML
8 INJECTION INTRAMUSCULAR; INTRAVENOUS
Status: COMPLETED | OUTPATIENT
Start: 2024-09-09 | End: 2024-09-09

## 2024-09-09 RX ADMIN — DEXAMETHASONE SODIUM PHOSPHATE 8 MG: 10 INJECTION INTRAMUSCULAR; INTRAVENOUS at 12:09

## 2024-09-09 NOTE — PATIENT INSTRUCTIONS
You have tested positive for COVID-19 today.      ISOLATION    - CDC now recommends to quarantine until you have overall improvement in symptoms and are without fever for 24 hours without the use of fever-reducing medication.  If you meet those to criteria, you may return to normal activity, however CDC also recommends at it precautions in the 5 days after return to normal activity including frequent hand washing, mask wearing, and physical distancing.     - you do NOT need to re-test for COVID       - Rest.    - Drink plenty of fluids.  - Viral upper respiratory infections typically run their course in 10-14 days.     - Tylenol (acetaminophen) or Ibuprofen as directed as needed for fever/pain. Avoid tylenol if you have a history of liver disease. Do not take ibuprofen if you have a history of GI bleeding, kidney disease, gastric surgery, or if you take blood thinners.     - You can take over-the-counter claritin, zyrtec, allegra, or xyzal as directed. These are antihistamines that can help with runny nose, nasal congestion, sneezing, and helps to dry up post-nasal drip, which usually causes sore throat and cough.   - If you do NOT have high blood pressure, you may use a decongestant form (D)  of this medication (ie. Claritin- D, zyrtec-D, allegra-D) or if you do not take the D form, you can take sudafed (pseudoephedrine) over the counter, which is a decongestant. Do NOT take two decongestant (D) medications at the same time (such as mucinex-D and claritin-D or plain sudafed and claritin D)    - You can use Flonase (fluticasone) nasal spray as directed for sinus congestion and postnasal drip. This is a steroid nasal spray that works locally over time to decrease the inflammation in your nose/sinuses and help with allergic symptoms. This is not an quick- relief spray like afrin, but it works well if used daily.  Discontinue if you develop nose bleed  - use OTC nasal saline prior to Flonase.  - you can use OTC nasal  saline such as Ocean Spray Nasal Saline 1-3 puffs each nostril every 2-3 hours then blow out onto tissue. This is to irrigate the nasal passage way to clear the sinus openings. Use until sinus problem resolved.    - you can take plain OTC Mucinex (guaifenesin) 1200 mg twice a day to help loosen mucous.     -warm salt water gargles can help with sore throat    - warm tea with honey can help with cough. Honey is a natural cough suppressant.    - Dextromethorphan (DM) is a cough suppressant over the counter (ie. mucinex DM, robitussin, delsym; dayquil/nyquil has DM as well.)    - You received a steroid (shot) today.  This can elevate your blood pressure, elevate your blood sugar, water weight gain, nervous energy, redness to the face and dimpling of the skin where the shot goes in.   - Do not use steroids more than 3 times per year.   - If you have diabetes, please check you blood sugar frequently.  - If you have high blood pressure, please check your blood pressure frequently.     - Follow up with your PCP or specialty clinic as directed in the next 1-2 weeks if not improved or as needed.  You can call [phone] to schedule an appointment with the appropriate provider.      - Go to the ER if you develop new or worsening symptoms.     - You must understand that you have received an Urgent Care treatment only and that you may be released before all of your medical problems are known or treated.   - You, the patient, will arrange for follow up care as instructed.   - If your condition worsens or fails to improve we recommend that you receive another evaluation at the ER immediately or contact your PCP to discuss your concerns or return here.

## 2024-09-09 NOTE — PROGRESS NOTES
Subjective:      Patient ID: Eliel Coffman is a 49 y.o. male.    Vitals:  weight is 113.4 kg (250 lb). His oral temperature is 100.2 °F (37.9 °C). His blood pressure is 131/80 and his pulse is 77. His respiration is 16 and oxygen saturation is 97%.     Chief Complaint: Cough    This is a 49 y.o. male who presents today with a chief complaint of productive cough, nasal congestion, runny nose, sore throat (last night only) and headache (last night only). Symptoms began yesterday. No ear px, ear congestion, nausea, vomiting, diarrhea, abd px, fever or     Home tx: OTC cold and flu, ibuprofen for body aches (T at 0945)      Cough  This is a new problem. The current episode started yesterday. The problem has been unchanged. The problem occurs constantly. The cough is Productive of sputum. Associated symptoms include chills, a fever, myalgias, nasal congestion and rhinorrhea. Pertinent negatives include no chest pain, ear congestion, ear pain, eye redness, headaches, rash, sore throat or shortness of breath. There is no history of asthma, bronchitis, environmental allergies or pneumonia.       Constitution: Positive for chills, fatigue and fever. Negative for sweating.   HENT:  Positive for congestion. Negative for ear pain and sore throat.    Neck: Negative for neck pain and neck stiffness.   Cardiovascular:  Negative for chest pain, leg swelling, palpitations and sob on exertion.   Eyes:  Negative for eye itching, eye pain and eye redness.   Respiratory:  Positive for cough and sputum production. Negative for shortness of breath.    Gastrointestinal:  Negative for abdominal pain, nausea, vomiting and diarrhea.   Genitourinary:  Negative for dysuria, frequency, urgency, flank pain and hematuria.   Musculoskeletal:  Positive for muscle ache. Negative for pain and joint pain.   Skin:  Negative for color change and rash.   Allergic/Immunologic: Negative for environmental allergies.   Neurological:  Negative for dizziness,  passing out, headaches, disorientation and numbness.   Psychiatric/Behavioral:  Negative for disorientation.       Objective:     Physical Exam   Constitutional: He is oriented to person, place, and time. He appears well-developed. He is cooperative.  Non-toxic appearance. He does not appear ill. No distress.   HENT:   Head: Normocephalic and atraumatic.   Ears:   Right Ear: Hearing, tympanic membrane, external ear and ear canal normal.   Left Ear: Hearing, tympanic membrane, external ear and ear canal normal.   Nose: Mucosal edema present. No rhinorrhea or nasal deformity. No epistaxis. Right sinus exhibits no maxillary sinus tenderness and no frontal sinus tenderness. Left sinus exhibits no maxillary sinus tenderness and no frontal sinus tenderness.   Mouth/Throat: Uvula is midline, oropharynx is clear and moist and mucous membranes are normal. No trismus in the jaw. Normal dentition. No uvula swelling. No oropharyngeal exudate, posterior oropharyngeal edema, posterior oropharyngeal erythema, tonsillar abscesses or cobblestoning. No tonsillar exudate.   Eyes: Conjunctivae and lids are normal. No scleral icterus.   Neck: Trachea normal and phonation normal. Neck supple. No edema present. No erythema present. No neck rigidity present.   Cardiovascular: Normal rate, regular rhythm, normal heart sounds and normal pulses.   Pulmonary/Chest: Effort normal. No accessory muscle usage or stridor. No tachypnea and no bradypnea. No respiratory distress. He has no decreased breath sounds. He has wheezes (faint end expiratory) in the right upper field and the left upper field. He has no rhonchi. He has no rales.   Abdominal: Normal appearance.   Musculoskeletal: Normal range of motion.         General: No deformity. Normal range of motion.   Lymphadenopathy:     He has no cervical adenopathy.   Neurological: He is alert and oriented to person, place, and time. He exhibits normal muscle tone. Coordination normal.   Skin: Skin  is warm, dry, intact, not diaphoretic and not pale.   Psychiatric: His speech is normal and behavior is normal. Judgment and thought content normal.   Nursing note and vitals reviewed.      Results for orders placed or performed in visit on 09/09/24   SARS Coronavirus 2 Antigen, POCT Manual Read   Result Value Ref Range    SARS Coronavirus 2 Antigen Positive (A) Negative     Acceptable Yes    POCT Influenza A/B MOLECULAR   Result Value Ref Range    POC Molecular Influenza A Ag Negative Negative    POC Molecular Influenza B Ag Negative Negative     Acceptable Yes        Assessment:     1. COVID-19 virus infection    2. Viral upper respiratory tract infection with cough        Plan:       COVID-19 virus infection  -     SARS Coronavirus 2 Antigen, POCT Manual Read  -     POCT Influenza A/B MOLECULAR  -     nirmatrelvir-ritonavir 300 mg (150 mg x 2)-100 mg copackaged tablets (EUA); Take 3 tablets by mouth 2 (two) times daily for 5 days. Each dose contains 2 nirmatrelvir (pink tablets) and 1 ritonavir (white tablet). Take all 3 tablets together  Dispense: 30 tablet; Refill: 0    Viral upper respiratory tract infection with cough  -     dexAMETHasone injection 8 mg      - Discussed ddx, home care, tx options, and given follow up precautions.  I have reviewed the patient's chart to view previous visits, labs, and imaging to assess PMH and look for any trends or previous treatments.  - counseled patient and answered questions in regards to COVID-19 testing and diagnosis and quarantine. Discussed home care and follow up precautions.   Pt requesting steroid injection, declined oral prednisone as alternative, faint expiratory wheeze.   - discussed paxlovid and patient does meet criteria. Informed about paxlovid, given fda EUA information to patient about medication and patient wants to take this medication for treatment of covid 19. ;

## 2024-09-10 ENCOUNTER — CLINICAL SUPPORT (OUTPATIENT)
Dept: REHABILITATION | Facility: HOSPITAL | Age: 49
End: 2024-09-10
Payer: COMMERCIAL

## 2024-09-10 ENCOUNTER — TELEPHONE (OUTPATIENT)
Dept: SPORTS MEDICINE | Facility: CLINIC | Age: 49
End: 2024-09-10
Payer: COMMERCIAL

## 2024-09-10 ENCOUNTER — PATIENT MESSAGE (OUTPATIENT)
Dept: SPORTS MEDICINE | Facility: CLINIC | Age: 49
End: 2024-09-10
Payer: COMMERCIAL

## 2024-09-10 DIAGNOSIS — M25.651 DECREASED RANGE OF RIGHT HIP MOVEMENT: Primary | ICD-10-CM

## 2024-09-10 DIAGNOSIS — R26.89 DECREASED FUNCTIONAL MOBILITY: ICD-10-CM

## 2024-09-10 PROCEDURE — 97112 NEUROMUSCULAR REEDUCATION: CPT

## 2024-09-10 PROCEDURE — 97530 THERAPEUTIC ACTIVITIES: CPT

## 2024-09-10 NOTE — PROGRESS NOTES
Physical Therapy Daily Treatment Note     Name: Eliel Coffman  [facility] Number: [id number]    Therapy Diagnosis:   Encounter Diagnoses   Name Primary?    Decreased range of right hip movement Yes    Decreased functional mobility      Physician: Jackelin García PA-C    Visit Date: 9/10/2024    Physician Orders: PT Eval and Treat   Medical Diagnosis from Referral: Z96.641 (ICD-10-CM) - S/P hip replacement, right  Evaluation Date: 8/8/2024  Authorization Period Expiration: 8/7/2025  Plan of Care Expiration: 10/17/2024  Visit # / Visits authorized: 9 / 20 + eval     PTA Visit: 1 / 5    Time In: 9:00 AM  Time Out: 10:03 AM  Total Billable Time: 63 minutes      Precautions: Standard, post op R MARCELINO ant    Subjective     Patient reports: he has been walking completely normal over the last few days but just aggravated/stretched his quad so it is a bit annoyed this morning.   He was compliant with home exercise program.  Response to previous treatment: appropriate muscle response  Functional change: ongoing; ambulating without assisive device     Pain: 2/10, currently  Location: Right hip      Objective     Objective Measures updated at progress report unless specified.   DOS 8/6/2024  Patient is 5 weeks, 0 days s/p Right MARCELINO (anterior approach) as of 9/10/2024.    Treatment     therapeutic exercises to develop strength, endurance, ROM, flexibility, posture, and core stabilization for 00 minutes including:    manual therapy techniques: Joint mobilizations, Manual traction, Myofacial release, Manual Lymphatic Drainage, Soft tissue Mobilization, and Friction Massage were applied to the: hip for 00 minutes, including:    neuromuscular re-education activities to improve: Balance, Coordination, Kinesthetic, Sense, Proprioception, and Posture for 10 minutes. The following activities were included:  Lateral band walks at half wall (16 feet) + Green PB; 5 laps  Standing hip abduction + Yellow PB band; 3 x 10 reps  Bilateral    therapeutic activities to improve functional performance for 53 minutes, including:   Treadmill 2.5 mph for gait mechanics   Front squat with std chair + 10 lb bar; 3 x 10 reps  Lateral kevin taps (9-inch kevin); 3 x 10 reps Bilateral  Shuttle DL Press; 75 lbs, 10 second hold x 20 reps  Resisted hip flexor marches; Yellow PB at feet, 10 second hold, 20 reps each LE  Beck carry with 12 lb KB bilateral with runner's hold; 5 second hold, 5 laps at half wall (16 feet)  Sled push (max weight 105#) 5'   Sit to stands std chair 25# KB 3 x 10     Home Exercises Provided and Patient Education Provided     Education provided:   - Pt educated on HEP and POC    Written Home Exercises Provided: Patient instructed to cont prior HEP.  Exercises were reviewed and Eliel was able to demonstrate them prior to the end of the session.  Eliel demonstrated fair  understanding of the education provided.     See EMR under Patient Instructions for exercises provided prior visit.    Assessment     Eliel is 5 weeks, 0 days s/p Right MARCELINO. Patient continues to progress with exercise interventions with focus on quadriceps and proximal hip strengthening and stabilization. Continue with minimal to moderate load for hip flexor and abductors but increased dosing for gross functional dynamic movements. Overall good challenge and tolerance without adverse reactions. Will continue to progress as tolerated and perform reassessment in upcoming sessions.     Eliel Is progressing well towards his goals.   Pt prognosis is Good.     Pt will continue to benefit from skilled outpatient physical therapy to address the deficits listed in the problem list box on initial evaluation, provide pt/family education and to maximize pt's level of independence in the home and community environment.     Pt's spiritual, cultural and educational needs considered and pt agreeable to plan of care and goals.     Anticipated barriers to physical therapy:  none    Goals:    Short Term Goals (4 Weeks): - Appropriate, ongoing  1. Pts pain decreased 20 - 40% for improved functional mobility and quality of life  2. Pts PROM in hip increased by 15 degrees to enhance AROM and functional mobility  3. Pts strength in the hip increased by 1/2 grade to facilitate improved active mobility and functional stability  4. Pt to exhibit correct return demonstration of exercises for self-management and independence with HEP  5. Pt to demonstrate enhanced neuromuscular response  with single leg static balance for improved functional mobility and gait pattern     Long Term Goals (8 Weeks): - Appropriate, ongoing  1.  Pts pain level decreased to 75% or greater for with activity for restoring functional mobility and ADL.  2. Pts AROM in hip increased by 25 degrees to restore functional mobility and ADL  3. Pts strength in the LE increased by one grade to facilitate improved active mobility and functional stability  4. Pt will be independent with HEP for self-management.   5. Pt to exhibit dynamic stability on the RLE / LLE for stable functional mobility and gait.   6. Pt to demonstrate symmetrical weight bearing w/o pain to improve gait pattern with assistive device   7. Patient will improve FOTO score to </= see FOTO% limited to decrease perceived limitation with mobility.     Plan     Plan of Care Certification: 8/8/2024 to 10/17/2024.     Outpatient Physical Therapy 1-2 times weekly for 8-10 weeks to include the following interventions: Gait Training, Manual Therapy, Moist Heat/ Ice, Neuromuscular Re-ed, Patient Education, Self Care, Therapeutic Activities, and Therapeutic Exercise.     Lulu Whitaker, PT, DPT

## 2024-09-10 NOTE — TELEPHONE ENCOUNTER
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 No available appts in Epic. Pt is willing to see any provider that is available today. Please call soon. Thanks.         Patient's DX: Post-Op

## 2024-09-16 ENCOUNTER — HOSPITAL ENCOUNTER (OUTPATIENT)
Dept: RADIOLOGY | Facility: HOSPITAL | Age: 49
Discharge: HOME | End: 2024-09-16
Attending: PHYSICIAN ASSISTANT
Payer: COMMERCIAL

## 2024-09-16 ENCOUNTER — OFFICE VISIT (OUTPATIENT)
Dept: SPORTS MEDICINE | Facility: CLINIC | Age: 49
End: 2024-09-16
Payer: COMMERCIAL

## 2024-09-16 VITALS
HEART RATE: 66 BPM | WEIGHT: 250 LBS | BODY MASS INDEX: 35 KG/M2 | DIASTOLIC BLOOD PRESSURE: 89 MMHG | SYSTOLIC BLOOD PRESSURE: 128 MMHG | HEIGHT: 71 IN

## 2024-09-16 DIAGNOSIS — M25.552 BILATERAL HIP PAIN: ICD-10-CM

## 2024-09-16 DIAGNOSIS — Z96.641 S/P HIP REPLACEMENT, RIGHT: Primary | ICD-10-CM

## 2024-09-16 DIAGNOSIS — M25.551 BILATERAL HIP PAIN: ICD-10-CM

## 2024-09-16 PROCEDURE — 73521 X-RAY EXAM HIPS BI 2 VIEWS: CPT | Mod: TC

## 2024-09-16 PROCEDURE — 99999 PR PBB SHADOW E&M-EST. PATIENT-LVL III: CPT | Mod: PBBFAC,,, | Performed by: PHYSICIAN ASSISTANT

## 2024-09-16 PROCEDURE — 1159F MED LIST DOCD IN RCRD: CPT | Mod: CPTII,S$GLB,, | Performed by: PHYSICIAN ASSISTANT

## 2024-09-16 PROCEDURE — 3074F SYST BP LT 130 MM HG: CPT | Mod: CPTII,S$GLB,, | Performed by: PHYSICIAN ASSISTANT

## 2024-09-16 PROCEDURE — 3079F DIAST BP 80-89 MM HG: CPT | Mod: CPTII,S$GLB,, | Performed by: PHYSICIAN ASSISTANT

## 2024-09-16 PROCEDURE — 1160F RVW MEDS BY RX/DR IN RCRD: CPT | Mod: CPTII,S$GLB,, | Performed by: PHYSICIAN ASSISTANT

## 2024-09-16 PROCEDURE — 99024 POSTOP FOLLOW-UP VISIT: CPT | Mod: S$GLB,,, | Performed by: PHYSICIAN ASSISTANT

## 2024-09-16 PROCEDURE — 73521 X-RAY EXAM HIPS BI 2 VIEWS: CPT | Mod: 26,,, | Performed by: RADIOLOGY

## 2024-09-16 NOTE — PROGRESS NOTES
Subjective:     Chief Complaint: Eliel Coffman is a 49 y.o. male who had concerns including Pain of the Right Hip.    Patient presents to clinic for 6 week post op evaluation of right hip. Pain today is 0/10. He has been discharged from formal PT, but he has been completing a HEP 3x a week consisting of bike rides, light leg strengthening exercises, and walking on a treadmill He is no longer taking narcotics. He has been taking ASA 325mg once daily for DVT prophylaxis. Doing well. Ambulating without an assistive device.Taking Celebrex 200mg BID. He is ready to return to work full duty.       Date of Procedure: 8/6/2024   Procedure:   1. Right  Replacement, hip, acetabular and proximal femoral prosthesis (Total Hip arthroplasty), with or without autograft or allograft [id number]     Surgeons and Role:     * Merlyn Patino MD - Primary     Assisting Surgeon: None     Pre-Operative Diagnosis: Right hip pain [M25.551]     Post-Operative Diagnosis: Post-Op Diagnosis Codes:     * Right hip pain [M25.551]     Anesthesia: General     Operative Findings (including complications, if any): end-stage right hip arthritis      Pain  Associated symptoms include joint swelling. Pertinent negatives include no abdominal pain, chest pain, chills, congestion, coughing, fever, headaches, myalgias, nausea, numbness, rash, sore throat or vomiting.       Review of Systems   Constitutional: Negative. Negative for chills, fever, weight gain and weight loss.   HENT:  Negative for congestion and sore throat.    Eyes:  Negative for blurred vision and double vision.   Cardiovascular:  Negative for chest pain, leg swelling and palpitations.   Respiratory:  Negative for cough and shortness of breath.    Hematologic/Lymphatic: Does not bruise/bleed easily.   Skin:  Negative for itching, poor wound healing and rash.   Musculoskeletal:  Positive for joint pain and joint swelling. Negative for back pain, muscle weakness, myalgias and stiffness.    Gastrointestinal:  Negative for abdominal pain, constipation, diarrhea, nausea and vomiting.   Genitourinary: Negative.  Negative for frequency and hematuria.   Neurological:  Negative for dizziness, headaches, numbness, paresthesias and sensory change.   Psychiatric/Behavioral:  Negative for altered mental status and depression. The patient is not nervous/anxious.    Allergic/Immunologic: Negative for hives.       Pain Related Questions  Over the past 3 days, what was your average pain during activity? (I.e. running, jogging, walking, climbing stairs, getting dressed, ect.): 0  Over the past 3 days, what was your highest pain level?: 0  Over the past 3 days, what was your lowest pain level? : 0    Other  How many nights a week are you awakened by your affected body part?: 0  Was the patient's HEIGHT measured or patient reported?: Measured  Was the patient's WEIGHT measured or patient reported?: Measured    Objective:     General: Eliel is well-developed, well-nourished, appears stated age, in no acute distress, alert and oriented to time, place and person.     General    Vitals reviewed.  Constitutional: He is oriented to person, place, and time. He appears well-developed and well-nourished. No distress.   Cardiovascular:  Normal rate and regular rhythm.            Pulmonary/Chest: Effort normal. No respiratory distress.   Neurological: He is alert and oriented to person, place, and time.   Psychiatric: He has a normal mood and affect. His behavior is normal. Thought content normal.           Right Knee Exam     Inspection   Alignment:  normal  Effusion: absent    Comments:           Right Hip Exam     Inspection   Scars: present  Swelling: present  Bruising: absent  No deformity of hip.  Quadriceps Atrophy:  Negative  Erythema: absent    Range of Motion   Abduction:  normal Right hip abduction: 25.  Extension:  0 normal   Flexion:  110 normal   External rotation:  30 normal   Internal rotation:  20 normal      Other   Sensation: normal    Comments:  Incision well healed      RADIOGRAPHS: 9/16/24  Bilateral hips:  S/p right hip replacement. Implants in satisfactory position.    Assessment:     Encounter Diagnosis   Name Primary?    S/P hip replacement, right Yes        Plan:     1. Continue Celebrex 200mg BID  2. Continue quad and hip HEP.  3. I made the decision to obtain old records of the patient including previous notes and imaging. New imaging was ordered today of the extremity or extremities evaluated. I independently reviewed and interpreted the radiographs and/or MRIs today as well as prior imaging. Reviewed with patient.   4. Rodriguez Hip Score was not filled out today in clinic.     RTC in 6 weeks with Dr. Merlyn Patino. Patient will fill out Rodriguez Hip Score on return.  5. Note provided releasing patient to full duty.        Patient questionnaires may have been collected.

## 2024-09-18 ENCOUNTER — PATIENT MESSAGE (OUTPATIENT)
Dept: REHABILITATION | Facility: HOSPITAL | Age: 49
End: 2024-09-18
Payer: COMMERCIAL

## 2024-09-18 ENCOUNTER — PATIENT MESSAGE (OUTPATIENT)
Dept: SPORTS MEDICINE | Facility: CLINIC | Age: 49
End: 2024-09-18
Payer: COMMERCIAL

## (undated) DEVICE — TAPE SURG MEDIPORE 6X72IN

## (undated) DEVICE — COVER LIGHT HANDLE 80/CA

## (undated) DEVICE — NDL HYPO STD REG BVL 18GX1.5IN

## (undated) DEVICE — KIT IRR SUCTION HND PIECE

## (undated) DEVICE — SUT STRATAFIX 1 PDS CT-1

## (undated) DEVICE — DRAPE STERI-DRAPE 1000 17X11IN

## (undated) DEVICE — SUT MONOCRYL 3-0 PS-2 UND

## (undated) DEVICE — DRAPE TOP 53X102IN

## (undated) DEVICE — SUT VICRYL PLUS 3-0 SH 18IN

## (undated) DEVICE — SUT SILK 2-0 STRANDS 30IN

## (undated) DEVICE — UNDERGLOVES BIOGEL PI SIZE 8.5

## (undated) DEVICE — KIT PT CARE HANA PROFX SSXT

## (undated) DEVICE — ELECTRODE BLADE TEFLON 6

## (undated) DEVICE — SOL NACL IRR 1000ML BTL

## (undated) DEVICE — PILLOW SMALL ABDUCTION

## (undated) DEVICE — PAD ABDOMINAL STERILE 8X10IN

## (undated) DEVICE — BNDG COFLEX FOAM LF2 ST 6X5YD

## (undated) DEVICE — PAD CAST SPECIALIST STRL 6

## (undated) DEVICE — TRAY TOTAL HIP CUSTOM

## (undated) DEVICE — HOOD T7 W/ PEEL AWAY LENS

## (undated) DEVICE — SOL NACL 0.9% INJ 250ML BG

## (undated) DEVICE — COVER CAMERA OPERATING ROOM

## (undated) DEVICE — WAVEGUIDE Y TAPER TIP

## (undated) DEVICE — GLOVE BIOGEL SKINSENSE PI 7.0

## (undated) DEVICE — DRESSING AQUACEL ADH 4X10IN

## (undated) DEVICE — SUT VICRYL+ 1 CT1 18IN

## (undated) DEVICE — SOL NACL IRR 3000ML

## (undated) DEVICE — UNDERGLOVES BIOGEL PI SZ 7 LF

## (undated) DEVICE — SYR 30CC LUER LOCK

## (undated) DEVICE — SEALER BIPOLAR TISSUE 6.0

## (undated) DEVICE — SPONGE COTTON TRAY 4X4IN

## (undated) DEVICE — DRAPE C-ARM ELAS CLIP 42X120IN

## (undated) DEVICE — CONTAINER SPECIMEN OR STER 4OZ

## (undated) DEVICE — DRAPE IOBAN 2 STERI

## (undated) DEVICE — ELECTRODE REM PLYHSV RETURN 9

## (undated) DEVICE — DRAPE STERI INSTRUMENT 1018

## (undated) DEVICE — BLADE SAG DUAL 18MMX1.27MMX90M

## (undated) DEVICE — ADHESIVE DERMABOND ADVANCED